# Patient Record
Sex: FEMALE | Race: WHITE
[De-identification: names, ages, dates, MRNs, and addresses within clinical notes are randomized per-mention and may not be internally consistent; named-entity substitution may affect disease eponyms.]

---

## 2018-04-04 ENCOUNTER — HOSPITAL ENCOUNTER (INPATIENT)
Dept: HOSPITAL 92 - ERS | Age: 83
LOS: 13 days | Discharge: HOSPICE | DRG: 871 | End: 2018-04-17
Attending: INTERNAL MEDICINE | Admitting: INTERNAL MEDICINE
Payer: MEDICARE

## 2018-04-04 VITALS — BODY MASS INDEX: 27.4 KG/M2

## 2018-04-04 DIAGNOSIS — E87.6: ICD-10-CM

## 2018-04-04 DIAGNOSIS — K81.0: ICD-10-CM

## 2018-04-04 DIAGNOSIS — N18.2: ICD-10-CM

## 2018-04-04 DIAGNOSIS — L89.892: ICD-10-CM

## 2018-04-04 DIAGNOSIS — F03.90: ICD-10-CM

## 2018-04-04 DIAGNOSIS — I12.0: ICD-10-CM

## 2018-04-04 DIAGNOSIS — E78.5: ICD-10-CM

## 2018-04-04 DIAGNOSIS — M81.0: ICD-10-CM

## 2018-04-04 DIAGNOSIS — G92: ICD-10-CM

## 2018-04-04 DIAGNOSIS — Z88.5: ICD-10-CM

## 2018-04-04 DIAGNOSIS — K83.0: ICD-10-CM

## 2018-04-04 DIAGNOSIS — R65.20: ICD-10-CM

## 2018-04-04 DIAGNOSIS — I49.5: ICD-10-CM

## 2018-04-04 DIAGNOSIS — E03.9: ICD-10-CM

## 2018-04-04 DIAGNOSIS — Z90.710: ICD-10-CM

## 2018-04-04 DIAGNOSIS — N39.0: ICD-10-CM

## 2018-04-04 DIAGNOSIS — I48.2: ICD-10-CM

## 2018-04-04 DIAGNOSIS — A41.9: Primary | ICD-10-CM

## 2018-04-04 DIAGNOSIS — L89.151: ICD-10-CM

## 2018-04-04 DIAGNOSIS — J69.0: ICD-10-CM

## 2018-04-04 DIAGNOSIS — E86.0: ICD-10-CM

## 2018-04-04 DIAGNOSIS — Z79.01: ICD-10-CM

## 2018-04-04 DIAGNOSIS — Z86.73: ICD-10-CM

## 2018-04-04 LAB
ALBUMIN SERPL BCG-MCNC: 3 G/DL (ref 3.4–4.8)
ALP SERPL-CCNC: 232 U/L (ref 40–150)
ALT SERPL W P-5'-P-CCNC: 201 U/L (ref 8–55)
ANION GAP SERPL CALC-SCNC: 10 MMOL/L (ref 10–20)
APTT PPP: 40.3 SEC (ref 22.9–36.1)
AST SERPL-CCNC: 239 U/L (ref 5–34)
BILIRUB SERPL-MCNC: 4.6 MG/DL (ref 0.2–1.2)
BUN SERPL-MCNC: 31 MG/DL (ref 9.8–20.1)
CALCIUM SERPL-MCNC: 8.6 MG/DL (ref 7.8–10.44)
CHLORIDE SERPL-SCNC: 107 MMOL/L (ref 98–107)
CO2 SERPL-SCNC: 28 MMOL/L (ref 23–31)
CREAT CL PREDICTED SERPL C-G-VRATE: 0 ML/MIN (ref 70–130)
GLOBULIN SER CALC-MCNC: 3.4 G/DL (ref 2.4–3.5)
GLUCOSE SERPL-MCNC: 120 MG/DL (ref 83–110)
HGB BLD-MCNC: 14.1 G/DL (ref 12–16)
INR PPP: 2.3
LIPASE SERPL-CCNC: (no result) U/L (ref 8–78)
MCH RBC QN AUTO: 32.4 PG (ref 27–31)
MCV RBC AUTO: 96.7 FL (ref 81–99)
MDIFF COMPLETE?: YES
PLATELET # BLD AUTO: 253 THOU/UL (ref 130–400)
PLATELET BLD QL SMEAR: (no result)
POTASSIUM SERPL-SCNC: 3.5 MMOL/L (ref 3.5–5.1)
PROTHROMBIN TIME: 26.2 SEC (ref 12–14.7)
RBC # BLD AUTO: 4.36 MILL/UL (ref 4.2–5.4)
SODIUM SERPL-SCNC: 141 MMOL/L (ref 136–145)
WBC # BLD AUTO: 29.6 THOU/UL (ref 4.8–10.8)

## 2018-04-04 PROCEDURE — 74177 CT ABD & PELVIS W/CONTRAST: CPT

## 2018-04-04 PROCEDURE — 36569 INSJ PICC 5 YR+ W/O IMAGING: CPT

## 2018-04-04 PROCEDURE — 85610 PROTHROMBIN TIME: CPT

## 2018-04-04 PROCEDURE — 83735 ASSAY OF MAGNESIUM: CPT

## 2018-04-04 PROCEDURE — 86140 C-REACTIVE PROTEIN: CPT

## 2018-04-04 PROCEDURE — C1729 CATH, DRAINAGE: HCPCS

## 2018-04-04 PROCEDURE — A4216 STERILE WATER/SALINE, 10 ML: HCPCS

## 2018-04-04 PROCEDURE — A4353 INTERMITTENT URINARY CATH: HCPCS

## 2018-04-04 PROCEDURE — 85049 AUTOMATED PLATELET COUNT: CPT

## 2018-04-04 PROCEDURE — 77012 CT SCAN FOR NEEDLE BIOPSY: CPT

## 2018-04-04 PROCEDURE — 85025 COMPLETE CBC W/AUTO DIFF WBC: CPT

## 2018-04-04 PROCEDURE — 87186 SC STD MICRODIL/AGAR DIL: CPT

## 2018-04-04 PROCEDURE — 36415 COLL VENOUS BLD VENIPUNCTURE: CPT

## 2018-04-04 PROCEDURE — 96361 HYDRATE IV INFUSION ADD-ON: CPT

## 2018-04-04 PROCEDURE — 85060 BLOOD SMEAR INTERPRETATION: CPT

## 2018-04-04 PROCEDURE — 81001 URINALYSIS AUTO W/SCOPE: CPT

## 2018-04-04 PROCEDURE — 87086 URINE CULTURE/COLONY COUNT: CPT

## 2018-04-04 PROCEDURE — 80069 RENAL FUNCTION PANEL: CPT

## 2018-04-04 PROCEDURE — 83690 ASSAY OF LIPASE: CPT

## 2018-04-04 PROCEDURE — 87205 SMEAR GRAM STAIN: CPT

## 2018-04-04 PROCEDURE — 80048 BASIC METABOLIC PNL TOTAL CA: CPT

## 2018-04-04 PROCEDURE — 80053 COMPREHEN METABOLIC PANEL: CPT

## 2018-04-04 PROCEDURE — 87070 CULTURE OTHR SPECIMN AEROBIC: CPT

## 2018-04-04 PROCEDURE — 85730 THROMBOPLASTIN TIME PARTIAL: CPT

## 2018-04-04 PROCEDURE — 89051 BODY FLUID CELL COUNT: CPT

## 2018-04-04 PROCEDURE — 82565 ASSAY OF CREATININE: CPT

## 2018-04-04 PROCEDURE — 36416 COLLJ CAPILLARY BLOOD SPEC: CPT

## 2018-04-04 PROCEDURE — 47010 HEPATOT OPN DRG ABSC/CST 1/2: CPT

## 2018-04-04 PROCEDURE — C9113 INJ PANTOPRAZOLE SODIUM, VIA: HCPCS

## 2018-04-04 PROCEDURE — 85014 HEMATOCRIT: CPT

## 2018-04-04 PROCEDURE — 85018 HEMOGLOBIN: CPT

## 2018-04-04 PROCEDURE — 71045 X-RAY EXAM CHEST 1 VIEW: CPT

## 2018-04-04 PROCEDURE — 96365 THER/PROPH/DIAG IV INF INIT: CPT

## 2018-04-04 PROCEDURE — 83605 ASSAY OF LACTIC ACID: CPT

## 2018-04-04 PROCEDURE — 87077 CULTURE AEROBIC IDENTIFY: CPT

## 2018-04-04 PROCEDURE — C1751 CATH, INF, PER/CENT/MIDLINE: HCPCS

## 2018-04-04 PROCEDURE — 84100 ASSAY OF PHOSPHORUS: CPT

## 2018-04-04 PROCEDURE — 84484 ASSAY OF TROPONIN QUANT: CPT

## 2018-04-04 PROCEDURE — 82805 BLOOD GASES W/O2 SATURATION: CPT

## 2018-04-04 PROCEDURE — 70450 CT HEAD/BRAIN W/O DYE: CPT

## 2018-04-05 RX ADMIN — Medication PRN ML: at 23:04

## 2018-04-05 RX ADMIN — Medication SCH ML: at 09:57

## 2018-04-05 RX ADMIN — POTASSIUM CHLORIDE, DEXTROSE MONOHYDRATE AND SODIUM CHLORIDE SCH MLS: 150; 5; 450 INJECTION, SOLUTION INTRAVENOUS at 23:54

## 2018-04-05 RX ADMIN — POTASSIUM CHLORIDE, DEXTROSE MONOHYDRATE AND SODIUM CHLORIDE SCH: 150; 5; 450 INJECTION, SOLUTION INTRAVENOUS at 18:23

## 2018-04-05 RX ADMIN — POTASSIUM CHLORIDE, DEXTROSE MONOHYDRATE AND SODIUM CHLORIDE SCH MLS: 150; 5; 450 INJECTION, SOLUTION INTRAVENOUS at 08:52

## 2018-04-05 RX ADMIN — Medication SCH ML: at 23:04

## 2018-04-05 NOTE — CON
DATE OF CONSULTATION:  04/05/2018

 

CHIEF COMPLAINT:  Possible cholecystitis.

 

HISTORY OF PRESENT ILLNESS:  The patient is an 88-year-old female who lives in a nursing home with stevie kay.  She is DNR.  Apparently yesterday, she stopped eating and developed nausea and vomiting.  Disha randle went to the emergency room in [place] where a CT scan and ultrasound showed a thickened gallbladder
 wall, multiple cholelithiasis.  She was sent here.

 

PAST MEDICAL HISTORY:  Significant for atrial fibrillation on Eliquis, history of CVA in 2014, hypert
ension, hypothyroidism, dementia, sick sinus syndrome.

 

PAST SURGICAL HISTORY:  She had a left femur fracture repair in 2010.  She has had a thyroidectomy an
d hysterectomy.

 

MEDICATIONS:  Aspirin, diltiazem, benzoate, Eliquis, Lasix, losartan, nadolol, potassium.

 

ALLERGIES:  She is allergic to CODEINE.

 

SOCIAL HISTORY:  Again, she lives in a nursing home.  No tobacco or alcohol.  I spoke to her daughter
 and daughter-in-law.

 

PHYSICAL EXAMINATION:

VITAL SIGNS:  Temperature 96.8, pulse 96, blood pressure 110/71.

GENERAL:    Elderly female, really not responsive.  She will open her eyes, but that is about it.  Th
ere is no interaction.

HEENT:  She is jaundiced.  She got obvious jaundice.

LUNGS:  Clear.

HEART:  Irregularly irregular.

ABDOMEN:  Soft.  She has some mild right upper quadrant tenderness to deep palpation, no palpable mas
s.

EXTREMITIES:  Unremarkable.

 

LABORATORY DATA AND X-RAY FINDINGS:  White count 29.6, H&H is 14 and 42, platelet count 253.  Electro
lytes show creatinine 0.8, glucose is elevated at 120.  Her bilirubin is 4.6, AST of 239, alkaline ph
osphatase 232, lipase is normal.

 

ASSESSMENT:  Acute cholecystitis, possible choledocholithiasis.

 

PLAN:  I talked to the daughter and daughter-in-law.  They are really not interested in the patient h
aving surgery, looking at other options, she is also high risk for surgery.  We will recommend GI con
sultation for possible ERCP as well as percutaneous drainage of the gallbladder.  We will talk to Rad
iology for that procedure.

## 2018-04-05 NOTE — CT
CT OF THE ABDOMEN AND PELVIS WITHOUT IV CONTRAST:

 

INDICATION: 

History of gallbladder disease requiring cholecystotomy tube.

 

COMPARISON: 

None.

 

FINDINGS: 

There are small bilateral pleural effusions and bibasilar atelectasis.  

 

There is cardiomegaly.

 

The adrenal glands are unremarkable.  There is some mild fatty atrophy of the pancreas.  The spleen i
s small.  There is fatty infiltration of the liver.  

 

There is a gallstone within a mildly distended gallbladder.  There is dilatation of the common bile d
uct without definite evidence of an intraluminal stone.  There is some mild nonspecific mesenteric st
randing within the mesenteric root.  There are small cysts involving both kidneys.

 

There is prominent vascular calcification involving the abdominopelvic vasculature.

 

Visualized aspects of the bladder are unremarkable.  There is wall thickening involving the rectum, w
hich is nonspecific.  There are a few scattered colonic diverticula without evidence of active divert
iculitis.

 

There is a right inguinal hernia containing nonobstructed loops of small bowel.  The appendix is not 
definitely seen.  There are multiple compression abnormalities involving the thoracolumbar spine.  Th
amadeo are of indeterminate chronicity.  There is diffuse osteopenia.  There is complete occlusion of th
e proximal right SFA artery.  There is a healed instrumented left hip fracture.  No acute osseous abn
ormality is evident.

 

IMPRESSION: 

1.  Cholelithiasis with mild distention of the gallbladder.  Recommend correlation with clinical exam
 for cholecystitis.

 

2.  Dilatation of the common bile duct without visible definite intraluminal stone. Minimal intrahepa
tic biliary ductal dilation.

 

3.  Small bilateral pleural effusion and cardiomegaly.  Recommend correlation regarding congestive he
art failure.

 

4.  Complete occlusion of the proximal right superficial femoral artery.

 

5.  Right inguinal hernia containing nonobstructive loops of small bowel.

 

6.  Small renal cysts.

 

7.  Colonic diverticulosis.

 

8.  Nonspecific wall thickening involving the rectum may reflect a component of proctitis.  Recommend
 correlation.

 

9.  Multiple age-indeterminate compression abnormalities involving T11, L1, L3, L4, and L5.

 

POS: [facility]

## 2018-04-05 NOTE — HP
DATE OF ADMISSION:  04/04/2018

 

PRIMARY CARE PHYSICIAN:  Dr. Sr.

 

CODE STATUS:  DO NOT RESUSCITATE.  Surrogate decision maker is the daughter at the bedside.

 

CHIEF COMPLAINT:  The patient is a transfer from [facility] for possible cholangitis.

 

HISTORY OF PRESENT ILLNESS:  The patient is an 88-year-old female with dementia, currently residing a
[facility], was brought in to [facility] with altered mentation.  Her workup in the
 emergency room was consistent with possible cholangitis.  Her lactic acid was 3.5.  Urinalysis showe
d nitrite positive, 20-50 wbc's, small amount of leukocyte esterase with many bacteria.  BNP was 1100
.  Troponin was negative.  BUN was 34, creatinine 0.96, potassium 4.2, bilirubin of 5.6 with alkaline
 phosphatase 254, , ALT of 265.  WBC 33.6, hemoglobin 14.6.  Lactic acid 3.8.  INR 2.2.  Ultra
sound of the abdomen showed cholelithiasis without sonographic findings to suggest acute cholecystiti
s.  CT abdomen and pelvis with contrast was consistent with cholelithiasis and dilated gallbladder wi
thout other findings of acute cholecystitis.  It showed indirect right inguinal hernia containing non
obstructed loops of distal small bowel.  There was some possible distal proctocolitis, which may be s
tercoral.  Chest x-ray was negative for infiltrate.  CT scan of the brain was negative for acute find
ings.  She received Zosyn with IV fluids and was transferred to this facility for hospital admission.


 

PAST MEDICAL HISTORY:

1.  Chronic atrial fibrillation on anticoagulation.

2.  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

3.  Hypertension.

4.  Hypothyroidism.

5.  Dementia.

6.  Sick sinus syndrome.

7.  Osteoporosis.

8.  Hard of hearing.

9.  Chronic anemia.

 

PAST SURGICAL HISTORY:

1.  Left femur surgery in 2010.

2.  Thyroid surgery.

3.  Hysterectomy.

 

ALLERGIES:  The patient is allergic to CODEINE.

 

CURRENT HOME MEDICATIONS:  We are trying to contact [facility].  There is no medication list in [facility] chart.

 

SOCIAL HISTORY:  As discussed above.  No smoking, alcohol or drug use.  She is wheelchair bound.  She
 requires help with transfers.  She is currently on pureed diet.  She has chronic cognitive issues an
d at times she is able to recognize her daughter.

 

FAMILY HISTORY:  Positive for hypertension and hypothyroidism.

 

REVIEW OF SYSTEMS:  Cannot be obtained reliably due to patient's current cognitive status.

 

PHYSICAL EXAMINATION:

VITAL SIGNS:  Current vital signs showed temperature 98.2, pulse rate of 119, respirations 20, blood 
pressure 115/81 with O2 saturation 99% on 2 liter nasal cannula.

GENERAL:  An 88-year-old female in no apparent distress.  Mentation at baseline per family at the bed
side.

HEENT:  Head atraumatic, normocephalic.  Sclerae are anicteric.  Dry mucous membranes.  No oral lesio
n.

NECK:  Supple, no JVD appreciated.  No carotid bruit.

LUNGS:  Showed decreased air entry at bilateral bases.  No significant wheezing, rales or rhonchi.

HEART:  S1, S2 present.  Irregularly irregular.  No murmur, rubs, or gallops appreciated.

ABDOMEN:  Soft, nontender, bowel sounds present, no rebound, guarding appreciated.

EXTREMITIES:  No edema or calf tenderness.

NEUROLOGIC:  Could not be done due to current cognitive status.

PSYCHIATRIC:  Could not be done due to current cognitive status.

SKIN:  Warm and dry.

LYMPH NODES:  No palpable lymph nodes in the neck.

 

LABORATORY AND X-RAY FINDINGS:  As discussed above.  Repeat WBC at this facility was 29.6 with 14% ba
ndemia.  INR was 2.3.  Total bilirubin 4.6 with , , alkaline phosphatase 232, albumin w
as 3.0.  Lipase was negative.  Magnesium 1.9.  A 2D echocardiogram from last admission showed left ve
ntricular ejection fraction of 50%-55%.  EKG by my review showed atrial fibrillation with nonspecific
 ST-T wave changes.

 

IMPRESSION:

1.  Sepsis with acute organ dysfunction with abnormal liver function tests.  Possibilities include ac
erinn cholangitis/questionable acute cholecystitis.

2.  Urinary tract infection.

3.  Dehydration.

4.  Chronic atrial fibrillation on anticoagulation.

5.  Hypertension.

6.  Hyperlipidemia.

7.  Dementia.

8.  Hypothyroidism.

9.  Swallow dysfunction, currently on pureed diet.

10.  CODEINE allergy.

11.  Chronic kidney disease stage 2.

 

PLAN:  The patient will be monitored in the telemetry unit.  We will keep her n.p.o.  Consult GI and 
General Surgery in a.m.  Continue Zosyn and urine cultures, IV fluids.  Monitor LFTs.  Repeat lactic 
acid was normal.  We will confirm home medications from [facility].

 

Plan of care was discussed with the patient's daughter at the bedside, she stated understanding.

 

Fall precautions.

 

N.p.o. for now.

## 2018-04-05 NOTE — PDOC.PN
- Subjective


Encounter Start Date: 04/05/18


Encounter Start Time: 14:14


Subjective: exhausted.no new complaints


-: daughter at bedside.care discussed





- Objective


Resuscitation Status: 


 











Resuscitation Status           DNR:Do Not Resuscitate














MAR Reviewed: Yes


Vital Signs & Weight: 


 Vital Signs (12 hours)











  Temp Pulse Resp BP Pulse Ox


 


 04/05/18 08:00  96.8 F L  96  20  110/71  96


 


 04/05/18 04:44  96.9 F L  90  36 H  100/61  93 L











I&O: 


 











 04/04/18 04/05/18 04/06/18





 06:59 06:59 06:59


 


Intake Total  600 0


 


Balance  600 0











Result Diagrams: 


 04/04/18 22:39





 04/04/18 22:39


Radiology Reviewed by me: Yes





Phys Exam





- Physical Examination


Constitutional: NAD


HEENT: PERRLA, moist MMs, sclera anicteric, oral pharynx no lesions


Neck: no nodes, no JVD, supple, full ROM


Respiratory: no wheezing, no rales, no rhonchi, clear to auscultation bilateral


Cardiovascular: RRR, no significant murmur


Gastrointestinal: soft, no distention


Musculoskeletal: no edema, pulses present


Neurological: moves all 4 limbs


Psychiatric: normal affect, A&O x 3


Skin: no rash





Dx/Plan


(1) Sepsis


Code(s): A41.9 - SEPSIS, UNSPECIFIED ORGANISM   Status: Acute   





(2) Acute cholangitis


Code(s): K83.0 - CHOLANGITIS   Status: Acute   





(3) UTI (urinary tract infection)


Status: Acute   





(4) Chronic atrial fibrillation


Code(s): I48.2 - CHRONIC ATRIAL FIBRILLATION   Status: Chronic   Comment: rate 

controlled. Eliquis on hold in anticipation of surgery   





(5) HTN (hypertension)


Code(s): I10 - ESSENTIAL (PRIMARY) HYPERTENSION   Status: Acute   





(6) Dementia


Code(s): F03.90 - UNSPECIFIED DEMENTIA WITHOUT BEHAVIORAL DISTURBANCE   Status: 

Acute   





- Plan


plan discussed w/ family, PT/OT, out of bed/ambulate, DVT proph w/SCDs


cont IVF,IV ABx,


-: awaiting GI and GS recs.


-: follow LFts.


-: resume home meds except for anticoagulation.


-: am labs





* .

## 2018-04-06 LAB
ALBUMIN SERPL BCG-MCNC: 2.8 G/DL (ref 3.4–4.8)
ALP SERPL-CCNC: 190 U/L (ref 40–150)
ALT SERPL W P-5'-P-CCNC: 102 U/L (ref 8–55)
ANION GAP SERPL CALC-SCNC: 8 MMOL/L (ref 10–20)
AST SERPL-CCNC: 56 U/L (ref 5–34)
BASOPHILS # BLD AUTO: 0 THOU/UL (ref 0–0.2)
BASOPHILS NFR BLD AUTO: 0.1 % (ref 0–1)
BILIRUB SERPL-MCNC: 2.1 MG/DL (ref 0.2–1.2)
BUN SERPL-MCNC: 16 MG/DL (ref 9.8–20.1)
CALCIUM SERPL-MCNC: 8.3 MG/DL (ref 7.8–10.44)
CHLORIDE SERPL-SCNC: 110 MMOL/L (ref 98–107)
CO2 SERPL-SCNC: 28 MMOL/L (ref 23–31)
CREAT CL PREDICTED SERPL C-G-VRATE: 58 ML/MIN (ref 70–130)
EOSINOPHIL # BLD AUTO: 0.1 THOU/UL (ref 0–0.7)
EOSINOPHIL NFR BLD AUTO: 0.4 % (ref 0–10)
GLOBULIN SER CALC-MCNC: 3.2 G/DL (ref 2.4–3.5)
GLUCOSE SERPL-MCNC: 141 MG/DL (ref 83–110)
HGB BLD-MCNC: 13.5 G/DL (ref 12–16)
LYMPHOCYTES # BLD: 1.8 THOU/UL (ref 1.2–3.4)
LYMPHOCYTES NFR BLD AUTO: 13.5 % (ref 21–51)
MAGNESIUM SERPL-MCNC: 1.9 MG/DL (ref 1.6–2.6)
MCH RBC QN AUTO: 31.1 PG (ref 27–31)
MCV RBC AUTO: 98.3 FL (ref 81–99)
MONOCYTES # BLD AUTO: 0.6 THOU/UL (ref 0.11–0.59)
MONOCYTES NFR BLD AUTO: 4.4 % (ref 0–10)
NEUTROPHILS # BLD AUTO: 10.6 THOU/UL (ref 1.4–6.5)
NEUTROPHILS NFR BLD AUTO: 81.5 % (ref 42–75)
PLATELET # BLD AUTO: 252 THOU/UL (ref 130–400)
POTASSIUM SERPL-SCNC: 3.3 MMOL/L (ref 3.5–5.1)
RBC # BLD AUTO: 4.34 MILL/UL (ref 4.2–5.4)
RBC # FLD AUTO: 45 /CUMM
RBC BACKGROUND COUNT: 0.01
SODIUM SERPL-SCNC: 143 MMOL/L (ref 136–145)
WBC # BLD AUTO: 13 THOU/UL (ref 4.8–10.8)
WBC # FLD AUTO: 251 /CUMM
WBC BACKGROUND COUNT: 0

## 2018-04-06 PROCEDURE — 0F943ZZ DRAINAGE OF GALLBLADDER, PERCUTANEOUS APPROACH: ICD-10-PCS | Performed by: RADIOLOGY

## 2018-04-06 RX ADMIN — POTASSIUM CHLORIDE, DEXTROSE MONOHYDRATE AND SODIUM CHLORIDE SCH MLS: 150; 5; 450 INJECTION, SOLUTION INTRAVENOUS at 13:34

## 2018-04-06 RX ADMIN — Medication SCH ML: at 09:56

## 2018-04-06 RX ADMIN — Medication SCH: at 20:54

## 2018-04-06 NOTE — CT
CT GUIDED SUBCUTANEOUS CHOLECYSTOTOMY:

 

HISTORY: 

Acute cholecystitis.  Poor surgical candidate. 

 

FINDINGS: 

After explaining the procedure and answering all questions, limited CT imaging of the upper abdomen w
as performed.  Anterior right upper quadrant approach as planned.  Sterile technique, buffered local 
anesthesia, CT guidance, and an anterolateral approach were used to carefully advance the tip of 22-g
auge needle into the gallbladder lumen.  While attempt was made to achieve purchase through the hepat
ic parenchyma, the gallbladder anatomy, position, and patient inability to cooperate with respiration
 of breathing precluded good hepatic parenchyma purchase approach.

 

Acupuncture system technique was then used to place a 0.035 Amplatz wire.  The tract was dilated to 8
 [place] and an 8 [place] locking loop Uresil catheter was placed in the gallbladder lumen.  Approximat
ely 20 cc of thick greenish liquid was aspirated.  A portion was sent to pathology for evaluation.  T
he catheter was secured externally with 2-0 Ethilon suture and left draining to gravity.  The patient
 tolerate the procedure well and was returned in improved condition.

 

IMPRESSION: 

Technically successful CT-guided cholecystostomy.  Pathology is pending.

 

POS: [facility]

## 2018-04-06 NOTE — PDOC.PN
- Subjective


Encounter Start Date: 04/06/18


Encounter Start Time: 14:12


Subjective: S/P CT GUIDED CHOLECYSTOSTOMY


-: Not bal eto discuss care d/t advanced dementia


-: family at bedside.care discussed w them.they report that pt is at baseline





- Objective


Resuscitation Status: 


 











Resuscitation Status           DNR:Do Not Resuscitate














MAR Reviewed: Yes


Vital Signs & Weight: 


 Vital Signs (12 hours)











  Temp Pulse Pulse Resp BP BP Pulse Ox


 


 04/06/18 12:15  97.4 F L  112 H   24 H   170/72 H  100


 


 04/06/18 08:40  97.3 F L  101 H   32 H   132/84  100


 


 04/06/18 08:00  98.2 F  74   28 H   144/74 H  95


 


 04/06/18 07:46    105 H   192/83 H  


 


 04/06/18 04:00  98.2 F  105 H   20   151/72 H  97














  Pulse Ox


 


 04/06/18 12:15 


 


 04/06/18 08:40 


 


 04/06/18 08:00 


 


 04/06/18 07:46  91 L


 


 04/06/18 04:00 








 Weight











Admit Weight                   145 lb 4 oz


 


Weight                         145 lb 4 oz














I&O: 


 











 04/05/18 04/06/18 04/07/18





 06:59 06:59 06:59


 


Intake Total 600 1000 


 


Balance 600 1000 











Result Diagrams: 


 04/06/18 05:01





 04/06/18 05:01


Additional Labs: 





 Laboratory Tests











  04/04/18 04/06/18





  22:39 05:01


 


Total Bilirubin  4.6 H  2.1 H


 


AST  239 H  56 H


 


ALT  201 H  102 H


 


Alkaline Phosphatase  232 H  190 H











Radiology Reviewed by me: Yes (Ct A/P- SFA occlusion R side,cholelithiasis)





Phys Exam





- Physical Examination


Constitutional: NAD


awake,doesn't follow commands.very hard of hearing.responds better to famil


dry mucosa,mouth breathing


Neck: no nodes, no JVD, supple, full ROM


Respiratory: no wheezing, no rales, no rhonchi, clear to auscultation bilateral


Cardiovascular: RRR, no significant murmur


Gastrointestinal: soft, no distention, positive bowel sounds


GB drain in place w brownish liquid


Musculoskeletal: no edema, pulses present


Neurological: moves all 4 limbs


Psychiatric: normal affect





Dx/Plan


(1) Sepsis


Code(s): A41.9 - SEPSIS, UNSPECIFIED ORGANISM   Status: Acute   





(2) Acute cholangitis


Code(s): K83.0 - CHOLANGITIS   Status: Acute   





(3) UTI (urinary tract infection)


Status: Acute   Comment: Cx pending.   





(4) Chronic atrial fibrillation


Code(s): I48.2 - CHRONIC ATRIAL FIBRILLATION   Status: Chronic   Comment: rate 

controlled. Eliquis on hold in anticipation of surgery   





(5) HTN (hypertension)


Code(s): I10 - ESSENTIAL (PRIMARY) HYPERTENSION   Status: Acute   





(6) Dementia


Code(s): F03.90 - UNSPECIFIED DEMENTIA WITHOUT BEHAVIORAL DISTURBANCE   Status: 

Acute   





- Plan


valle catheter, continue antibiotics, PT/OT, speech therapy, DVT proph w/SCDs


Cont post-op care.appreciate GI & GS input


-: cont ABx.


-: SFA oclusion discussed w family.they do not want any surgery for her.


-: replace and recheck potassium


-: Home meds not updated.Eliquis on hold





* .OT,PT. may need rehab.Lives at [facility]








Review of Systems





- Review of Systems


Other: 





can not be obtained due to dementia





- Medications/Allergies


Allergies/Adverse Reactions: 


 Allergies











Allergy/AdvReac Type Severity Reaction Status Date / Time


 


codeine Allergy   Verified 07/21/14 20:16











Medications: 


 Current Medications





Calcium Carbonate (Tums)  1,000 mg PO Q4H PRN


   PRN Reason: Heartburn  or Indigestion


Hydralazine HCl (Apresoline)  10 mg SLOW IVP Q4H PRN


   PRN Reason: SBP Greater Than 180


Piperacillin Sod/Tazobactam (Sod 3.375 gm/ Sodium Chloride)  100 mls @ 200 mls/

hr IVPB Q6HR [facility]


   Last Admin: 04/06/18 12:16 Dose:  100 mls


Potassium Chloride/Dextrose/Sod Cl (D5 1/2 Ns W/20 Meq Kcl)  1,000 mls @ 75 mls/

hr IV .[id number] [facility]


   Last Admin: 04/06/18 13:34 Dose:  1,000 mls


Miscellaneous Medication (Pharmacy To Dose)  1 each IVPB PRN PRN


   PRN Reason: Pharmacy to dose


Morphine Sulfate (Morphine)  2 mg SLOW IVP NOW [facility]


   Stop: 04/06/18 15:00


   Last Admin: 04/06/18 13:33 Dose:  2 mg


Ondansetron HCl (Zofran Odt)  4 mg PO Q6H PRN


   PRN Reason: Nausea/Vomiting


Ondansetron HCl (Zofran)  4 mg IVP Q6H PRN


   PRN Reason: Nausea/Vomiting


Pantoprazole Sodium (Protonix)  40 mg IVP DAILY [facility]


   Last Admin: 04/06/18 09:55 Dose:  40 mg


Sodium Chloride (Flush - Normal Saline)  10 ml IVF Q12HR [facility]


   Last Admin: 04/06/18 09:56 Dose:  10 ml


Sodium Chloride (Flush - Normal Saline)  10 ml IVF PRN PRN


   PRN Reason: Saline Flush


   Last Admin: 04/05/18 23:04 Dose:  10 ml

## 2018-04-06 NOTE — PRG
DATE OF SERVICE:  04/06/2018

 

SUBJECTIVE:  Ms. Karolina Celestin is an 88-year-old unfortunate female hospitalized with abnormal LF
Ts and sepsis and possible cholangitis.  The patient was seen by Dr. Zander Carey and patient is high r
isk for surgery.  She underwent a percutaneous cholecystostomy by Dr. Kumar this morning.  She is d
oing well at the present time.  She is aphasic and she cannot communicate.

 

OBJECTIVE:

GENERAL:  Appears comfortable.

VITAL SIGNS:  Temperature 98.3 Fahrenheit.  Pulse is 74, blood pressure 144/77.

CARDIOVASCULAR:  Within normal limits.

LUNGS:  Within normal limits.

ABDOMEN:  Soft to palpate.  Abdomen is nondistended.

 

The patient's LFTs are coming down even before her cholecystostomy.

 

LABORATORY DATA:  The lab data from today, sodium 143, potassium 3.3, chloride 110, bicarbonate 28, B
UN is 16, creatinine 0.70.  Glucose 141, bilirubin down to 2.1, AST down to 56, , alkaline jenny
sphatase 190.  CBC: WBC count has come down to 13,000, normal hemoglobin and hematocrit.  The differe
ntial count shows no bandemia today, polymorphs 81, monocytes 4, basophils 0.1, lymphocytes 13.5.

 

RECOMMENDATION:  Possible cholangitis, cholecystitis, status post cholecystectomy.

 

From GI standpoint, _____ workup planned.  Hopefully she can be discharged back to the nursing home i
n the near future.

## 2018-04-06 NOTE — CON
DATE OF CONSULTATION:  04/05/2018

 

REFERRING PHYSICIAN:  Dr. Moe Meraz.

 

REASON FOR CONSULTATION:  Abnormal liver function tests and distended gallbladder on sonogram and CAT
 scan and possibility of cholangitis.

 

HISTORY OF PRESENT ILLNESS:  Ms. Karolina Celestin is an 88-year-old  female, who is a nursi
ng home resident.  Her regular primary care doctor, Dr. Sr.  The patient was seen in [facility] in [place] with altered mental status.  The patient was seen at [facility] and had ev
aluation done.  It was found to have a UTI and also evidence of abnormal LFTs.  The bilirubin level o
f 5.6 and alkaline phosphatase 254.  She had an abdominal sonogram and CAT scan.  The CAT scan and so
nogram showed markedly distended gallbladder, but did not show any evidence of any dilation of the bi
le duct.  She also had a leukocytosis with WBC count of more than 30,000 and also she has bandemia.  
Her lactic acid was 3.8.  It was felt that she has septic and there is a possibility of cholangitis. 
 She was transferred to [facility] last night.  The patient was last seen in the room along 
with the patient's daughters.  The patient is nonverbal.  Apparently, she has had a CVA in the past a
nd she is aphasic.  She also has dementia.  She is also hard of hearing.  Most of the history is obta
ined by going over the admitting history and physical by Dr. Meraz.  The patient was seen by Dr. Zander Carey today and Dr. Zander Carey recommended that percutaneous cholecystostomy because of poor surgica
l outcome _____.  The sonogram and CAT both showed gallbladder stone and also distended gallbladder. 
 There is no _____ dilated CBD.  The patient is lying in bed and appears comfortable in no distress. 
 There is no other relevant history available.

 

MEDICAL ILLNESSES:

1.  Chronic atrial fibrillation, on anticoagulation.

2.  History of cerebrovascular accident 2014.

3.  Dementia.

4.  Hypertension.

5.  Hypothyroidism.

6.  Sick sinus syndrome.

7.  Osteoporosis

8.  Chronic anemia.

 

SURGERIES:

1.  Status post left knee surgery in 2010.

2.  Thyroid surgery.

3.  Hysterectomy.

 

MEDICATIONS:  List reviewed.

 

REVIEW OF SYSTEMS:  Unobtainable because of the dementia and also aphasia.

 

PHYSICAL EXAMINATION:

GENERAL:  The patient appears comfortable and she really has no response to any questioning.  She is 
in no distress.  She is icteric.

VITAL SIGNS:  Temperature 99 degrees Fahrenheit.  Pulse is 80, blood pressure 130/70.

NECK:  Supple.

CARDIOVASCULAR:  First and second heart sounds normal.

LUNGS:  Clear to auscultation.

ABDOMEN:  Soft to palpate.  Abdomen is nondistended.  Abdomen is nontender even on deep palpation.  T
here is no rebound or guarding.  No organomegaly or masses.

 

LABORATORY DATA:  Showed a UTI with wbc's 20 to 50 and small amount of leukoesterase and many bacteri
a.  BUN was 34, creatinine 0.96, potassium 4.2, bilirubin 5.6, alkaline phosphatase 254.  , AL
T 265.  WBC 33,600 with bandemia, hemoglobin 14.6.  Lactic acid 3.8.

 

CLINICAL IMPRESSION:

1.  An 88-year-old female with altered mental status with no history of abdominal pain, nausea, vomit
ing.  The evaluation in the ER did show abnormal LFTs and subsequently, she had an abdominal sonogram
 and CAT scan.  Both showed gallstones and also distended gallbladder.  There is no dilated CBD or in
trahepatic ducts.

2.  Urinary tract infection.

 

I believe she most likely has acute cholecystitis and at the present time, there is no evidence of an
y bile duct dilatation.  I agree with Dr. Zander Carey's recommendation to proceed with a percutaneous 
cholecystostomy.

## 2018-04-07 LAB
ALBUMIN SERPL BCG-MCNC: 2.8 G/DL (ref 3.4–4.8)
ALP SERPL-CCNC: 174 U/L (ref 40–150)
ALT SERPL W P-5'-P-CCNC: 67 U/L (ref 8–55)
ANION GAP SERPL CALC-SCNC: 10 MMOL/L (ref 10–20)
AST SERPL-CCNC: 28 U/L (ref 5–34)
BASOPHILS # BLD AUTO: 0.1 THOU/UL (ref 0–0.2)
BASOPHILS NFR BLD AUTO: 0.5 % (ref 0–1)
BILIRUB SERPL-MCNC: 2.4 MG/DL (ref 0.2–1.2)
BUN SERPL-MCNC: 14 MG/DL (ref 9.8–20.1)
CALCIUM SERPL-MCNC: 8.4 MG/DL (ref 7.8–10.44)
CHLORIDE SERPL-SCNC: 113 MMOL/L (ref 98–107)
CO2 SERPL-SCNC: 23 MMOL/L (ref 23–31)
CREAT CL PREDICTED SERPL C-G-VRATE: 64 ML/MIN (ref 70–130)
EOSINOPHIL # BLD AUTO: 0.1 THOU/UL (ref 0–0.7)
EOSINOPHIL NFR BLD AUTO: 0.7 % (ref 0–10)
GLOBULIN SER CALC-MCNC: 3.1 G/DL (ref 2.4–3.5)
GLUCOSE SERPL-MCNC: 133 MG/DL (ref 83–110)
HGB BLD-MCNC: 13.8 G/DL (ref 12–16)
LYMPHOCYTES # BLD: 2.5 THOU/UL (ref 1.2–3.4)
LYMPHOCYTES NFR BLD AUTO: 21.9 % (ref 21–51)
MAGNESIUM SERPL-MCNC: 1.9 MG/DL (ref 1.6–2.6)
MCH RBC QN AUTO: 31.5 PG (ref 27–31)
MCV RBC AUTO: 97.6 FL (ref 81–99)
MONOCYTES # BLD AUTO: 0.8 THOU/UL (ref 0.11–0.59)
MONOCYTES NFR BLD AUTO: 6.7 % (ref 0–10)
NEUTROPHILS # BLD AUTO: 8.1 THOU/UL (ref 1.4–6.5)
NEUTROPHILS NFR BLD AUTO: 70.3 % (ref 42–75)
PLATELET # BLD AUTO: 233 THOU/UL (ref 130–400)
POTASSIUM SERPL-SCNC: 4.2 MMOL/L (ref 3.5–5.1)
RBC # BLD AUTO: 4.38 MILL/UL (ref 4.2–5.4)
SODIUM SERPL-SCNC: 142 MMOL/L (ref 136–145)
WBC # BLD AUTO: 11.5 THOU/UL (ref 4.8–10.8)

## 2018-04-07 RX ADMIN — Medication SCH ML: at 21:46

## 2018-04-07 RX ADMIN — LACTOBACILLUS ACIDOPH-L.BULGARICUS 1 MILLION CELL CHEWABLE TABLET SCH TAB: at 21:38

## 2018-04-07 RX ADMIN — POTASSIUM CHLORIDE, DEXTROSE MONOHYDRATE AND SODIUM CHLORIDE SCH MLS: 150; 5; 450 INJECTION, SOLUTION INTRAVENOUS at 05:38

## 2018-04-07 RX ADMIN — LACTOBACILLUS ACIDOPH-L.BULGARICUS 1 MILLION CELL CHEWABLE TABLET SCH TAB: at 15:27

## 2018-04-07 RX ADMIN — Medication SCH ML: at 08:55

## 2018-04-07 NOTE — PDOC.PN
- Subjective


Encounter Start Date: 04/07/18


Encounter Start Time: 14:02


Subjective: feels about the same. denies any pain.no new complaints


-: daughter at bedside. car ediscussed in detail


-: Pt w aphasia & dementia,so limited participation





- Objective


Resuscitation Status: 


 











Resuscitation Status           DNR:Do Not Resuscitate














MAR Reviewed: Yes


Vital Signs & Weight: 


 Vital Signs (12 hours)











  Temp Pulse Resp BP Pulse Ox


 


 04/07/18 11:25  96.3 F L  134 H  30 H  133/90  97


 


 04/07/18 07:41  96.8 F L  108 H  20  


 


 04/07/18 07:30  96.8 F L  108 H  20  141/93 H  98


 


 04/07/18 04:00  96.8 F L  106 H  18  135/87  97








 Weight











Admit Weight                   145 lb 4 oz


 


Weight                         145 lb 4 oz














I&O: 


 











 04/06/18 04/07/18 04/08/18





 06:59 06:59 06:59


 


Intake Total 1000 3930 


 


Output Total  550 


 


Balance 1000 3380 











Result Diagrams: 


 04/07/18 04:50





 04/07/18 04:50


Additional Labs: 





Microbiology





04/06/18 09:15   Gallbladder - Aspirate   Bacterial Culture - Preliminary





 Laboratory Tests











  04/04/18 04/06/18 04/06/18





  22:39 05:01 09:15


 


Total Bilirubin  4.6 H  2.1 H 


 


AST  239 H  56 H 


 


ALT  201 H  102 H 


 


Alkaline Phosphatase  232 H  190 H 


 


Fluid WBC    251


 


Fluid RBC (Manual)    45














  04/07/18





  04:50


 


Total Bilirubin  2.4 H


 


AST  28


 


ALT  67 H


 


Alkaline Phosphatase  174 H


 


Fluid WBC 


 


Fluid RBC (Manual) 














Phys Exam





- Physical Examination


Constitutional: NAD


sleeping but wakes up easily.jaundiced


HEENT: PERRLA, 2+ tonsils


Neck: no JVD


Respiratory: no wheezing, no rales, no rhonchi, clear to auscultation bilateral


Cardiovascular: no significant murmur, irregular


Gastrointestinal: soft, non-tender, no distention, positive bowel sounds


Musculoskeletal: no edema, pulses present


hemiparesis,aphasic


Psychiatric: normal affect


Skin: no rash





Dx/Plan


(1) Sepsis


Code(s): A41.9 - SEPSIS, UNSPECIFIED ORGANISM   Status: Acute   





(2) Acute cholangitis


Code(s): K83.0 - CHOLANGITIS   Status: Acute   





(3) UTI (urinary tract infection)


Status: Acute   Comment: Cx pending.GNR   





(4) Chronic atrial fibrillation


Code(s): I48.2 - CHRONIC ATRIAL FIBRILLATION   Status: Chronic   Comment: rate 

controlled. Eliquis on hold in anticipation of surgery   





(5) HTN (hypertension)


Code(s): I10 - ESSENTIAL (PRIMARY) HYPERTENSION   Status: Acute   





(6) Dementia


Code(s): F03.90 - UNSPECIFIED DEMENTIA WITHOUT BEHAVIORAL DISTURBANCE   Status: 

Acute   





- Plan


PT/OT, , respiratory therapy, incentive spirometry, DVT proph w/

SCDs


Home meds update and restarted including BB and CCB.


-: cont to hold eliquis to prevent risk of bleed from surgical site


-: cont ABx.follow Cx from urine & bile.on zosyn


-: DC IVF.hold lasix for now.If renal Fx stable,will restart from tomorrow


-: am labs.DC back to [facility] when ok w GS





* .








Review of Systems





- Review of Systems


Other: 





limited ROS due to demetia and Aphasia due to old stroke





- Medications/Allergies


Allergies/Adverse Reactions: 


 Allergies











Allergy/AdvReac Type Severity Reaction Status Date / Time


 


codeine Allergy   Verified 07/21/14 20:16











Medications: 


 Current Medications





Acidophilus (Floranex)  1 tab PO TID [facility]


Aspirin (Aspirin Chewable)  81 mg PO DAILY [facility]


Benzonatate (Tessalon)  200 mg PO TIDPRN PRN


   PRN Reason: Cough


Calcium Carbonate (Tums)  1,000 mg PO Q4H PRN


   PRN Reason: Heartburn  or Indigestion


Diltiazem HCl (Cardizem)  30 mg PO Q6H [facility]


   Last Admin: 04/07/18 12:55 Dose:  30 mg


Hydralazine HCl (Apresoline)  10 mg SLOW IVP Q4H PRN


   PRN Reason: SBP Greater Than 180


Piperacillin Sod/Tazobactam (Sod 3.375 gm/ Sodium Chloride)  100 mls @ 200 mls/

hr IVPB Q6HR [facility]


   Last Admin: 04/07/18 11:40 Dose:  100 mls


Levothyroxine Sodium (Synthroid)  75 mcg PO 0600 [facility]


Miscellaneous Medication (Pharmacy To Dose)  1 each IVPB PRN PRN


   PRN Reason: Pharmacy to dose


Nadolol (Corgard)  40 mg PO DAILY [facility]


Ondansetron HCl (Zofran Odt)  4 mg PO Q6H PRN


   PRN Reason: Nausea/Vomiting


Ondansetron HCl (Zofran)  4 mg IVP Q6H PRN


   PRN Reason: Nausea/Vomiting


Pantoprazole Sodium (Protonix)  40 mg PO DAILY [facility]


Simvastatin (Zocor)  5 mg PO HS [facility]


Sodium Chloride (Flush - Normal Saline)  10 ml IVF Q12HR [facility]


   Last Admin: 04/07/18 08:55 Dose:  10 ml


Sodium Chloride (Flush - Normal Saline)  10 ml IVF PRN PRN


   PRN Reason: Saline Flush


   Last Admin: 04/05/18 23:04 Dose:  10 ml


Tramadol HCl (Ultram)  50 mg PO Q4H PRN


   PRN Reason: Pain


   Last Admin: 04/06/18 20:58 Dose:  50 mg

## 2018-04-08 LAB
ALBUMIN SERPL BCG-MCNC: 2.8 G/DL (ref 3.4–4.8)
ALP SERPL-CCNC: 150 U/L (ref 40–150)
ALT SERPL W P-5'-P-CCNC: 50 U/L (ref 8–55)
ANION GAP SERPL CALC-SCNC: 12 MMOL/L (ref 10–20)
AST SERPL-CCNC: 22 U/L (ref 5–34)
BASOPHILS # BLD AUTO: 0 THOU/UL (ref 0–0.2)
BASOPHILS NFR BLD AUTO: 0.1 % (ref 0–1)
BILIRUB SERPL-MCNC: 2 MG/DL (ref 0.2–1.2)
BUN SERPL-MCNC: 16 MG/DL (ref 9.8–20.1)
CALCIUM SERPL-MCNC: 8.3 MG/DL (ref 7.8–10.44)
CHLORIDE SERPL-SCNC: 111 MMOL/L (ref 98–107)
CO2 SERPL-SCNC: 22 MMOL/L (ref 23–31)
CREAT CL PREDICTED SERPL C-G-VRATE: 62 ML/MIN (ref 70–130)
EOSINOPHIL # BLD AUTO: 0.1 THOU/UL (ref 0–0.7)
EOSINOPHIL NFR BLD AUTO: 0.6 % (ref 0–10)
GLOBULIN SER CALC-MCNC: 3.3 G/DL (ref 2.4–3.5)
GLUCOSE SERPL-MCNC: 130 MG/DL (ref 83–110)
HGB BLD-MCNC: 13.8 G/DL (ref 12–16)
LYMPHOCYTES # BLD: 2.1 THOU/UL (ref 1.2–3.4)
LYMPHOCYTES NFR BLD AUTO: 19.5 % (ref 21–51)
MCH RBC QN AUTO: 31.5 PG (ref 27–31)
MCV RBC AUTO: 96.9 FL (ref 81–99)
MONOCYTES # BLD AUTO: 0.9 THOU/UL (ref 0.11–0.59)
MONOCYTES NFR BLD AUTO: 8.4 % (ref 0–10)
NEUTROPHILS # BLD AUTO: 7.9 THOU/UL (ref 1.4–6.5)
NEUTROPHILS NFR BLD AUTO: 71.5 % (ref 42–75)
PLATELET # BLD AUTO: 234 THOU/UL (ref 130–400)
POTASSIUM SERPL-SCNC: 3.7 MMOL/L (ref 3.5–5.1)
RBC # BLD AUTO: 4.39 MILL/UL (ref 4.2–5.4)
SODIUM SERPL-SCNC: 141 MMOL/L (ref 136–145)
WBC # BLD AUTO: 11 THOU/UL (ref 4.8–10.8)

## 2018-04-08 RX ADMIN — Medication PRN ML: at 23:53

## 2018-04-08 RX ADMIN — LACTOBACILLUS ACIDOPH-L.BULGARICUS 1 MILLION CELL CHEWABLE TABLET SCH TAB: at 08:49

## 2018-04-08 RX ADMIN — Medication SCH ML: at 21:04

## 2018-04-08 RX ADMIN — LACTOBACILLUS ACIDOPH-L.BULGARICUS 1 MILLION CELL CHEWABLE TABLET SCH TAB: at 21:04

## 2018-04-08 RX ADMIN — Medication SCH: at 08:49

## 2018-04-08 RX ADMIN — LACTOBACILLUS ACIDOPH-L.BULGARICUS 1 MILLION CELL CHEWABLE TABLET SCH TAB: at 14:32

## 2018-04-08 NOTE — PDOC.PN
- Subjective


Encounter Start Date: 04/08/18


Encounter Start Time: 11:46


Subjective: feels OK. very hard of hearing so limited conversation


-: also severe dysarthria d/t old CVA


-: no new events per nursing 





- Objective


Resuscitation Status: 


 











Resuscitation Status           DNR:Do Not Resuscitate














MAR Reviewed: Yes


Vital Signs & Weight: 


 Vital Signs (12 hours)











  Temp Pulse Resp BP Pulse Ox


 


 04/08/18 08:00  97.4 F L  106 H  22 H  


 


 04/08/18 07:39  97.4 F L  106 H  22 H  160/97 H  94 L


 


 04/08/18 04:00  96.8 F L  98  32 H  139/76  97


 


 04/08/18 00:00  97.9 F  118 H  36 H  138/84  100








 Weight











Admit Weight                   145 lb 4 oz


 


Weight                         145 lb 4 oz














I&O: 


 











 04/07/18 04/08/18 04/09/18





 06:59 06:59 06:59


 


Intake Total 3930 1140 


 


Output Total 550 400 


 


Balance 3380 740 











Result Diagrams: 


 04/08/18 04:39





 04/08/18 04:39


Additional Labs: 





Microbiology





04/06/18 09:15   Gallbladder - Aspirate   Bacterial Culture - Final


                              Escherichia coli





 Laboratory Tests











  04/04/18 04/06/18 04/07/18





  22:39 05:01 04:50


 


Total Bilirubin  4.6 H  2.1 H  2.4 H


 


AST  239 H  56 H  28


 


ALT  201 H  102 H  67 H


 


Alkaline Phosphatase  232 H  190 H  174 H














  04/08/18





  04:39


 


Total Bilirubin  2.0 H


 


AST  22


 


ALT  50


 


Alkaline Phosphatase  150














Phys Exam





- Physical Examination


Constitutional: NAD


more awake and interactive today,mouth breather


HEENT: PERRLA, sclera anicteric


dry mucosa due to mouth breathing


Neck: no JVD


Respiratory: no wheezing, no rales, no rhonchi


Cardiovascular: no significant murmur, irregular


Gastrointestinal: soft, non-tender, no distention, positive bowel sounds


Musculoskeletal: no edema, pulses present


Neurological: non-focal, normal sensation, moves all 4 limbs


Psychiatric: normal affect


Skin: no rash


Deviation from normal: Jaundice improved





Dx/Plan


(1) Sepsis


Code(s): A41.9 - SEPSIS, UNSPECIFIED ORGANISM   Status: Acute   





(2) Acute cholangitis


Code(s): K83.0 - CHOLANGITIS   Status: Acute   Comment: S/P Cholecystostomy 

with external drain   





(3) UTI (urinary tract infection)


Status: Acute   Comment: Cx pending.GNR   





(4) Chronic atrial fibrillation


Code(s): I48.2 - CHRONIC ATRIAL FIBRILLATION   Status: Chronic   Comment: rate 

controlled. Eliquis on hold due to recent surgery   





(5) HTN (hypertension)


Code(s): I10 - ESSENTIAL (PRIMARY) HYPERTENSION   Status: Chronic   





(6) Dementia


Code(s): F03.90 - UNSPECIFIED DEMENTIA WITHOUT BEHAVIORAL DISTURBANCE   Status: 

Chronic   





(7) Transaminitis


Code(s): R74.0 - NONSPEC ELEV OF LEVELS OF TRANSAMNS & LACTIC ACID DEHYDRGNSE   

Status: Acute   Comment: improving.d/t GB stones and obstructive Jaundice   





(8) Hyperbilirubinemia


Code(s): E80.6 - OTHER DISORDERS OF BILIRUBIN METABOLISM   Status: Acute   

Comment: improving.d/t GB stones and obstructive Jaundice   





- Plan


valle catheter, continue antibiotics, PT/OT, respiratory therapy, incentive 

spirometry, out of bed/ambulate, DVT proph w/SCDs


Tachycardia improved w starting home meds including BB & CCB.BP WNL.monitor


-: E.coli in bile-sensitive to zosyn-continue.Drain in place


-: GS following.will need to know if pt needs to be discharged with/without it


-: LFTs,Bilirubin improving daily post Cholecystostomy


-: cont supportive care.restart lasix at lower dose & monitor renal Fx





* .








Review of Systems





- Review of Systems


Other: 





limited d/t dysarthria,hearing loss and dementia





- Medications/Allergies


Allergies/Adverse Reactions: 


 Allergies











Allergy/AdvReac Type Severity Reaction Status Date / Time


 


codeine Allergy   Verified 07/21/14 20:16











Medications: 


 Current Medications





Acidophilus (Floranex)  1 tab PO TID [facility]


   Last Admin: 04/08/18 08:49 Dose:  1 tab


Aspirin (Aspirin Chewable)  81 mg PO DAILY [facility]


   Last Admin: 04/08/18 08:49 Dose:  81 mg


Benzonatate (Tessalon)  200 mg PO TIDPRN PRN


   PRN Reason: Cough


Calcium Carbonate (Tums)  1,000 mg PO Q4H PRN


   PRN Reason: Heartburn  or Indigestion


Diltiazem HCl (Cardizem)  30 mg PO Q6H [facility]


   Last Admin: 04/08/18 05:05 Dose:  30 mg


Hydralazine HCl (Apresoline)  10 mg SLOW IVP Q4H PRN


   PRN Reason: SBP Greater Than 180


Piperacillin Sod/Tazobactam (Sod 3.375 gm/ Sodium Chloride)  100 mls @ 200 mls/

hr IVPB Q6HR [facility]


   Last Admin: 04/08/18 05:46 Dose:  100 mls


Levothyroxine Sodium (Synthroid)  75 mcg PO 0600 [facility]


   Last Admin: 04/08/18 05:06 Dose:  75 mcg


Miscellaneous Medication (Pharmacy To Dose)  1 each IVPB PRN PRN


   PRN Reason: Pharmacy to dose


Nadolol (Corgard)  40 mg PO DAILY [facility]


   Last Admin: 04/08/18 08:49 Dose:  40 mg


Ondansetron HCl (Zofran Odt)  4 mg PO Q6H PRN


   PRN Reason: Nausea/Vomiting


Ondansetron HCl (Zofran)  4 mg IVP Q6H PRN


   PRN Reason: Nausea/Vomiting


Pantoprazole Sodium (Protonix)  40 mg PO DAILY [facility]


   Last Admin: 04/08/18 08:49 Dose:  40 mg


Simvastatin (Zocor)  5 mg PO HS [facility]


   Last Admin: 04/07/18 21:38 Dose:  5 mg


Sodium Chloride (Flush - Normal Saline)  10 ml IVF Q12HR [facility]


   Last Admin: 04/08/18 08:49 Dose:  Not Given


Sodium Chloride (Flush - Normal Saline)  10 ml IVF PRN PRN


   PRN Reason: Saline Flush


   Last Admin: 04/05/18 23:04 Dose:  10 ml


Tramadol HCl (Ultram)  50 mg PO Q4H PRN


   PRN Reason: Pain


   Last Admin: 04/06/18 20:58 Dose:  50 mg

## 2018-04-09 LAB
ANION GAP SERPL CALC-SCNC: 12 MMOL/L (ref 10–20)
BASOPHILS # BLD AUTO: 0 THOU/UL (ref 0–0.2)
BASOPHILS NFR BLD AUTO: 0.2 % (ref 0–1)
BUN SERPL-MCNC: 13 MG/DL (ref 9.8–20.1)
CALCIUM SERPL-MCNC: 8.5 MG/DL (ref 7.8–10.44)
CHLORIDE SERPL-SCNC: 105 MMOL/L (ref 98–107)
CO2 SERPL-SCNC: 25 MMOL/L (ref 23–31)
CREAT CL PREDICTED SERPL C-G-VRATE: 59 ML/MIN (ref 70–130)
EOSINOPHIL # BLD AUTO: 0.1 THOU/UL (ref 0–0.7)
EOSINOPHIL NFR BLD AUTO: 0.6 % (ref 0–10)
GLUCOSE SERPL-MCNC: 113 MG/DL (ref 83–110)
HGB BLD-MCNC: 14 G/DL (ref 12–16)
LYMPHOCYTES # BLD: 2.2 THOU/UL (ref 1.2–3.4)
LYMPHOCYTES NFR BLD AUTO: 20.6 % (ref 21–51)
MCH RBC QN AUTO: 31.3 PG (ref 27–31)
MCV RBC AUTO: 96.6 FL (ref 81–99)
MONOCYTES # BLD AUTO: 1 THOU/UL (ref 0.11–0.59)
MONOCYTES NFR BLD AUTO: 9.5 % (ref 0–10)
NEUTROPHILS # BLD AUTO: 7.3 THOU/UL (ref 1.4–6.5)
NEUTROPHILS NFR BLD AUTO: 69.1 % (ref 42–75)
PLATELET # BLD AUTO: 246 THOU/UL (ref 130–400)
POTASSIUM SERPL-SCNC: 3.2 MMOL/L (ref 3.5–5.1)
RBC # BLD AUTO: 4.46 MILL/UL (ref 4.2–5.4)
SODIUM SERPL-SCNC: 139 MMOL/L (ref 136–145)
WBC # BLD AUTO: 10.6 THOU/UL (ref 4.8–10.8)

## 2018-04-09 RX ADMIN — Medication PRN ML: at 05:18

## 2018-04-09 RX ADMIN — LACTOBACILLUS ACIDOPH-L.BULGARICUS 1 MILLION CELL CHEWABLE TABLET SCH TAB: at 09:23

## 2018-04-09 RX ADMIN — LACTOBACILLUS ACIDOPH-L.BULGARICUS 1 MILLION CELL CHEWABLE TABLET SCH: at 22:18

## 2018-04-09 RX ADMIN — Medication SCH ML: at 22:19

## 2018-04-09 RX ADMIN — Medication SCH ML: at 09:24

## 2018-04-09 RX ADMIN — LACTOBACILLUS ACIDOPH-L.BULGARICUS 1 MILLION CELL CHEWABLE TABLET SCH TAB: at 18:01

## 2018-04-09 NOTE — PDOC.PN
- Subjective


Encounter Start Date: 04/09/18


Encounter Start Time: 10:30





Patient seen and examined. No new complaints. No overnight events





- Objective


Resuscitation Status: 


 











Resuscitation Status           DNR:Do Not Resuscitate














MAR Reviewed: Yes


Vital Signs & Weight: 


 Vital Signs (12 hours)











  Temp Pulse Resp BP BP Pulse Ox


 


 04/09/18 20:00  98.0 F  101 H  20   134/81  90 L


 


 04/09/18 13:20  98.5 F  90  20  108/71   93 L


 


 04/09/18 12:00  97.8 F  79  18  121/75   95








 Weight











Admit Weight                   145 lb 4 oz


 


Weight                         145 lb 4 oz














I&O: 


 











 04/08/18 04/09/18 04/10/18





 06:59 06:59 06:59


 


Intake Total 1140 1610 


 


Output Total 400 3103 550


 


Balance 484 -7247 -550











Result Diagrams: 


 04/10/18 04:49





 04/10/18 04:49


EKG Reviewed by me: Yes (Tele Afib)





Phys Exam





- Physical Examination


Constitutional: NAD


Respiratory: no wheezing, no rhonchi


Cardiovascular: no rub, irregular


Gastrointestinal: soft, non-tender, positive bowel sounds


Musculoskeletal: no edema


Neurological: moves all 4 limbs





Dx/Plan





- Plan


DVT proph w/lovenox, DVT proph w/SCDs





IMPRESSION:


1.  Sepsis with acute organ dysfunction duet to acute cholangitis/?acute 

cholecystitis.


2.  Hypokalemia


3.  Chronic atrial fibrillation


4.  Hypertension.


5. 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  

STAGE I (Present of admission)


6. Other issues per previous notes





PLAN:  


* Change Atbx to Ceftriaxone based on culture


* Replace Potassium


* AM labs


* DC planning


* Surg following











                                                            





Review of Systems





- Review of Systems


Respiratory: negative: Cough, Dry, Shortness of Breath, Hemoptysis, SOB with 

Excertion, Pleuritic Pain, Sputum, Wheezing


Cardiovascular: negative: chest pain, palpitations, orthopnea, paroxysmal 

nocturnal dyspnea, edema, light headedness, other


Gastrointestinal: negative: Nausea, Vomiting, Abdominal Pain, Diarrhea, 

Constipation, Melena, Hematochezia, Other





- Medications/Allergies


Allergies/Adverse Reactions: 


 Allergies











Allergy/AdvReac Type Severity Reaction Status Date / Time


 


codeine Allergy   Verified 07/21/14 20:16











Medications: 


 Current Medications





Acidophilus (Floranex)  1 tab PO TID [facility]


   Last Admin: 04/09/18 18:01 Dose:  1 tab


Aspirin (Aspirin Chewable)  81 mg PO DAILY [facility]


   Last Admin: 04/09/18 09:23 Dose:  81 mg


Benzonatate (Tessalon)  200 mg PO TIDPRN PRN


   PRN Reason: Cough


Calcium Carbonate (Tums)  1,000 mg PO Q4H PRN


   PRN Reason: Heartburn  or Indigestion


Diltiazem HCl (Cardizem)  30 mg PO Q6H [facility]


   Last Admin: 04/09/18 18:10 Dose:  30 mg


Furosemide (Lasix)  20 mg PO DAILY [facility]


   Last Admin: 04/09/18 09:23 Dose:  20 mg


Hydralazine HCl (Apresoline)  10 mg SLOW IVP Q4H PRN


   PRN Reason: SBP Greater Than 180


Piperacillin Sod/Tazobactam (Sod 3.375 gm/ Sodium Chloride)  100 mls @ 200 mls/

hr IVPB Q6HR [facility]


   Last Admin: 04/09/18 18:06 Dose:  100 mls


Levothyroxine Sodium (Synthroid)  75 mcg PO 0600 [facility]


   Last Admin: 04/09/18 05:17 Dose:  75 mcg


Miscellaneous Medication (Pharmacy To Dose)  1 each IVPB PRN PRN


   PRN Reason: Pharmacy to dose


Nadolol (Corgard)  40 mg PO DAILY [facility]


   Last Admin: 04/09/18 09:24 Dose:  40 mg


Ondansetron HCl (Zofran Odt)  4 mg PO Q6H PRN


   PRN Reason: Nausea/Vomiting


Ondansetron HCl (Zofran)  4 mg IVP Q6H PRN


   PRN Reason: Nausea/Vomiting


Pantoprazole Sodium (Protonix)  40 mg PO DAILY [facility]


   Last Admin: 04/09/18 09:24 Dose:  40 mg


Simvastatin (Zocor)  5 mg PO HS [facility]


   Last Admin: 04/08/18 21:04 Dose:  5 mg


Sodium Chloride (Flush - Normal Saline)  10 ml IVF Q12HR [facility]


   Last Admin: 04/09/18 09:24 Dose:  10 ml


Sodium Chloride (Flush - Normal Saline)  10 ml IVF PRN PRN


   PRN Reason: Saline Flush


   Last Admin: 04/09/18 05:18 Dose:  10 ml


Tramadol HCl (Ultram)  50 mg PO Q4H PRN


   PRN Reason: Pain


   Last Admin: 04/06/18 20:58 Dose:  50 mg

## 2018-04-10 LAB
ALBUMIN SERPL BCG-MCNC: 2.8 G/DL (ref 3.4–4.8)
ALP SERPL-CCNC: 151 U/L (ref 40–150)
ALT SERPL W P-5'-P-CCNC: 28 U/L (ref 8–55)
ANALYZER IN CARDIO: (no result)
ANION GAP SERPL CALC-SCNC: 10 MMOL/L (ref 10–20)
AST SERPL-CCNC: 17 U/L (ref 5–34)
BASE EXCESS STD BLDA CALC-SCNC: 0.2 MEQ/L
BASOPHILS # BLD AUTO: 0 THOU/UL (ref 0–0.2)
BASOPHILS NFR BLD AUTO: 0 % (ref 0–1)
BILIRUB SERPL-MCNC: 2.5 MG/DL (ref 0.2–1.2)
BUN SERPL-MCNC: 11 MG/DL (ref 9.8–20.1)
CA-I BLDA-SCNC: 1.1 MMOL/L (ref 1.12–1.3)
CALCIUM SERPL-MCNC: 8.3 MG/DL (ref 7.8–10.44)
CHLORIDE SERPL-SCNC: 107 MMOL/L (ref 98–107)
CO2 SERPL-SCNC: 28 MMOL/L (ref 23–31)
CREAT CL PREDICTED SERPL C-G-VRATE: 62 ML/MIN (ref 70–130)
CRYSTAL-AUWI FLAG: 0.2 (ref 0–15)
EOSINOPHIL # BLD AUTO: 0.1 THOU/UL (ref 0–0.7)
EOSINOPHIL NFR BLD AUTO: 0.8 % (ref 0–10)
GLOBULIN SER CALC-MCNC: 3.5 G/DL (ref 2.4–3.5)
GLUCOSE SERPL-MCNC: 82 MG/DL (ref 83–110)
HCO3 BLDA-SCNC: 25.6 MEQ/L (ref 22–26)
HCT VFR BLDA CALC: 42.6 % (ref 36–47)
HEV IGM SER QL: 2.6 (ref 0–7.99)
HGB BLD-MCNC: 14.3 G/DL (ref 12–16)
HGB BLDA-MCNC: 13.8 G/DL (ref 12–16)
HYALINE CASTS #/AREA URNS LPF: (no result) LPF
LYMPHOCYTES # BLD: 2.5 THOU/UL (ref 1.2–3.4)
LYMPHOCYTES NFR BLD AUTO: 26.5 % (ref 21–51)
MAGNESIUM SERPL-MCNC: 1.7 MG/DL (ref 1.6–2.6)
MCH RBC QN AUTO: 31.1 PG (ref 27–31)
MCV RBC AUTO: 96.2 FL (ref 81–99)
MONOCYTES # BLD AUTO: 1 THOU/UL (ref 0.11–0.59)
MONOCYTES NFR BLD AUTO: 10.2 % (ref 0–10)
NEUTROPHILS # BLD AUTO: 5.8 THOU/UL (ref 1.4–6.5)
NEUTROPHILS NFR BLD AUTO: 62.5 % (ref 42–75)
PATHC CAST-AUWI FLAG: 0 (ref 0–2.49)
PCO2 BLDA: 44.6 MMHG (ref 35–45)
PH BLDA: 7.38 [PH] (ref 7.35–7.45)
PLATELET # BLD AUTO: 291 THOU/UL (ref 130–400)
PO2 BLDA: 87 MMHG (ref 80–100)
POTASSIUM SERPL-SCNC: 3.4 MMOL/L (ref 3.5–5.1)
PROT UR STRIP.AUTO-MCNC: 30 MG/DL
RBC # BLD AUTO: 4.58 MILL/UL (ref 4.2–5.4)
RBC UR QL AUTO: (no result) HPF (ref 0–3)
SODIUM SERPL-SCNC: 142 MMOL/L (ref 136–145)
SP GR UR STRIP: 1.03 (ref 1–1.04)
SPECIMEN DRAWN FROM PATIENT: (no result)
SPERM-AUWI FLAG: 0 (ref 0–9.9)
TROPONIN I SERPL DL<=0.01 NG/ML-MCNC: (no result) NG/ML (ref ?–0.03)
WBC # BLD AUTO: 9.3 THOU/UL (ref 4.8–10.8)
WBC UR QL AUTO: (no result) HPF (ref 0–3)
YEAST-AUWI FLAG: 0 (ref 0–25)

## 2018-04-10 PROCEDURE — 02HV33Z INSERTION OF INFUSION DEVICE INTO SUPERIOR VENA CAVA, PERCUTANEOUS APPROACH: ICD-10-PCS | Performed by: RADIOLOGY

## 2018-04-10 RX ADMIN — POTASSIUM CHLORIDE, DEXTROSE MONOHYDRATE AND SODIUM CHLORIDE SCH MLS: 150; 5; 450 INJECTION, SOLUTION INTRAVENOUS at 22:41

## 2018-04-10 RX ADMIN — LACTOBACILLUS ACIDOPH-L.BULGARICUS 1 MILLION CELL CHEWABLE TABLET SCH: at 11:50

## 2018-04-10 RX ADMIN — LACTOBACILLUS ACIDOPH-L.BULGARICUS 1 MILLION CELL CHEWABLE TABLET SCH: at 22:42

## 2018-04-10 RX ADMIN — POTASSIUM CHLORIDE, DEXTROSE MONOHYDRATE AND SODIUM CHLORIDE SCH: 150; 5; 450 INJECTION, SOLUTION INTRAVENOUS at 18:43

## 2018-04-10 RX ADMIN — LACTOBACILLUS ACIDOPH-L.BULGARICUS 1 MILLION CELL CHEWABLE TABLET SCH: at 15:01

## 2018-04-10 RX ADMIN — Medication SCH ML: at 11:51

## 2018-04-10 RX ADMIN — Medication SCH: at 22:42

## 2018-04-10 RX ADMIN — POTASSIUM CHLORIDE, DEXTROSE MONOHYDRATE AND SODIUM CHLORIDE SCH MLS: 150; 5; 450 INJECTION, SOLUTION INTRAVENOUS at 11:49

## 2018-04-10 NOTE — RAD
SINGLE VIEW OF THE CHEST:

 

Comparison: 10-4-12

 

History: Code Green. Shortness of breath. 

 

FINDINGS: 

Single view of the chest shows an enlarged cardiomediastinal silhouette. There are small bilateral pl
eural effusions with adjacent atelectasis versus infiltrates. Increased interstitial markings are pre
sent. 

 

IMPRESSION: 

1. Cardiomegaly. 

2. Bilateral pleural effusions with adjacent atelectasis versus infiltrate. 

 

POS: [facility]

## 2018-04-10 NOTE — PDOC.PN
- Subjective


Encounter Start Date: 04/10/18


Encounter Start Time: 10:00


-: non-verbal





Patient seen and examined. Mental worsened overnight. Now not following 

commands. 





- Objective


Resuscitation Status: 


 











Resuscitation Status           DNR:Do Not Resuscitate














MAR Reviewed: Yes


Vital Signs & Weight: 


 Vital Signs (12 hours)











  Temp Temp Pulse Pulse Resp BP BP


 


 04/10/18 16:00  97.3 F L   97   20   134/84


 


 04/10/18 10:24   97.6 F   96   136/83 














  Pulse Ox Pulse Ox


 


 04/10/18 16:00  96 


 


 04/10/18 10:24   95








 Weight











Admit Weight                   145 lb 4 oz


 


Weight                         145 lb 4 oz














I&O: 


 











 04/09/18 04/10/18 04/11/18





 06:59 06:59 06:59


 


Intake Total 1610 100 500


 


Output Total 2895 980 870


 


Balance -1285 -880 -370











Result Diagrams: 


 04/10/18 04:49





 04/10/18 04:49


Additional Labs: 


 Accuchecks











  04/10/18 04/10/18 04/10/18





  20:19 16:09 10:33


 


POC Glucose  133 H  86  74











Radiology Reviewed by me: Yes (CXR - ?infiltrate, CT brain - neg)





Phys Exam





- Physical Examination


Pt is encephalopathic


Neck: no JVD


Respiratory: no wheezing, no rhonchi


Scat rales at bases, Symmetrical


Cardiovascular: RRR, no rub


No heaves/pulsations


Gastrointestinal: soft, non-tender, no distention, positive bowel sounds


Musculoskeletal: no edema


Neuro/Psych - pt is not following commands, No hypertonia/rigidity


Skin: no rash





Dx/Plan





- Plan


plan discussed w/ family, PT/OT, , respiratory therapy, DVT 

proph w/lovenox, DVT proph w/SCDs





IMPRESSION:


1.  Sepsis with acute organ dysfunction duet to acute cholangitis/?acute 

cholecystitis.


2.  Toxic Metabolic Encephalopathy - multifactorial


3.  ?Aspiration Pneumonia 


4.  Chronic atrial fibrillation


5.  Hypertension.


6. 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  

STAGE I (Present of admission)


7. Hypokalemia / Other issues per previous notes





PLAN:  


* Code green called due to worsening mentation - CT brain negative, CXR ?

infiltrate


* Resume anticoag for Afib - I d/w Dr Carey who agrees with anticoag initiation


* PICC line placement


* Change Atbx to Meropenem


* Stat labs ordered.


* AM labs


* DC planning


* Surg following








Review of Systems





- Review of Systems


Other: 





Cannot obtain due to current mentation





- Medications/Allergies


Allergies/Adverse Reactions: 


 Allergies











Allergy/AdvReac Type Severity Reaction Status Date / Time


 


codeine Allergy   Verified 07/21/14 20:16











Medications: 


 Current Medications





Acidophilus (Floranex)  1 tab PO TID [facility]


   Last Admin: 04/10/18 15:01 Dose:  Not Given


Aspirin (Aspirin Chewable)  81 mg PO DAILY [facility]


   Last Admin: 04/10/18 11:50 Dose:  Not Given


Benzonatate (Tessalon)  200 mg PO TIDPRN PRN


   PRN Reason: Cough


Calcium Carbonate (Tums)  1,000 mg PO Q4H PRN


   PRN Reason: Heartburn  or Indigestion


Enoxaparin Sodium (Lovenox)  60 mg SC 0900,2100 [facility]


Hydralazine HCl (Apresoline)  10 mg SLOW IVP Q4H PRN


   PRN Reason: SBP Greater Than 180


Potassium Chloride/Dextrose/Sod Cl (D5 1/2 Ns W/20 Meq Kcl)  1,000 mls @ 125 mls

/hr IV .Q8H [facility]


   Last Admin: 04/10/18 18:43 Dose:  Not Given


Meropenem 1 gm/ Sodium (Chloride)  100 mls @ 200 mls/hr IVPB Q8HR [facility]


   Last Admin: 04/10/18 16:15 Dose:  100 mls


Labetalol HCl (Normodyne)  10 mg SLOW IVP Q4H PRN


   PRN Reason: Systolic BP > 160


Levothyroxine Sodium (Synthroid)  40 mcg IVP 0600 [facility]


Miscellaneous Medication (Pharmacy To Dose)  1 each IVPB PRN PRN


   PRN Reason: Pharmacy to dose


Nadolol (Corgard)  40 mg PO DAILY [facility]


   Last Admin: 04/10/18 11:50 Dose:  Not Given


Ondansetron HCl (Zofran Odt)  4 mg PO Q6H PRN


   PRN Reason: Nausea/Vomiting


Ondansetron HCl (Zofran)  4 mg IVP Q6H PRN


   PRN Reason: Nausea/Vomiting


Pantoprazole Sodium (Protonix)  40 mg IVP DAILY [facility]


Potassium Chloride (K-Dur)  20 meq PO QAM-WM [facility]


   Last Admin: 04/10/18 11:50 Dose:  Not Given


Simvastatin (Zocor)  5 mg PO HS [facility]


   Last Admin: 04/09/18 22:18 Dose:  Not Given


Sodium Chloride (Flush - Normal Saline)  10 ml IVF Q12HR [facility]


   Last Admin: 04/10/18 11:51 Dose:  10 ml


Sodium Chloride (Flush - Normal Saline)  10 ml IVF PRN PRN


   PRN Reason: Saline Flush


   Last Admin: 04/09/18 05:18 Dose:  10 ml


Tramadol HCl (Ultram)  50 mg PO Q4H PRN


   PRN Reason: Pain


   Last Admin: 04/06/18 20:58 Dose:  50 mg

## 2018-04-10 NOTE — CT
CT BRAIN WITHOUT COTNRAST:

 

COMPARISON: 

None.

 

HISTORY: 

Change of level of consciousness.  Evaluate for stroke.

 

TECHNIQUE: 

Multiple contiguous axial images were obtained in a CT of the brain without contrast.

 

FINDINGS: 

There are scattered hypodensities in the subcortical and periventricular white matter, likely seconda
ry to small-vessel ischemic disease.  No large confluent infarction is seen.  There is no evidence of
 hydrocephalus, intracranial hemorrhage, or extraaxial fluid collection.

 

The calvarium and overlying soft tissues are unremarkable.  The visualized paranasal sinuses and mast
oid air cells are well aerated.

 

IMPRESSION: 

1.  No evidence of acute intracranial abnormality.

 

2.  Extensive small-vessel ischemic disease.

 

POS: [facility]

## 2018-04-10 NOTE — CT
CT GUIDED SUBCUTANEOUS CHOLECYSTOTOMY:

 

HISTORY: 

Acute cholecystitis.  Poor surgical candidate. 

 

FINDINGS: 

After explaining the procedure and answering all questions, limited CT imaging of the upper abdomen w
as performed.  Anterior right upper quadrant approach as planned.  Sterile technique, buffered local 
anesthesia, CT guidance, and an anterolateral approach were used to carefully advance the tip of 22-g
auge needle into the gallbladder lumen.  While attempt was made to achieve purchase through the hepat
ic parenchyma, the gallbladder anatomy, position, and patient inability to cooperate with respiration
 of breathing precluded good hepatic parenchyma purchase approach.

 

Acupuncture system technique was then used to place a 0.035 Amplatz wire.  The tract was dilated to 8
 Ecuadorean and an 8 Ecuadorean locking loop Uresil catheter was placed in the gallbladder lumen.  Approximat
ely 20 cc of thick greenish liquid was aspirated.  A portion was sent to pathology for evaluation.  T
he catheter was secured externally with 2-0 Ethilon suture and left draining to gravity.  The patient
 tolerate the procedure well and was returned in improved condition.

 

IMPRESSION: 

Technically successful CT-guided cholecystostomy.  Pathology is pending.

## 2018-04-10 NOTE — SPC
EXAM:

LEFT UPPER EXTREMITY PICC LINE PLACEMENT WITH ULTRASOUND GUIDANCE.

4/10/18

 

HISTORY: 

Infection. IV access for antibiotics is required.

 

COMPARISON:  

None.

 

EXPOSURE:

0.8 minutes. 4261 mGy*cm2.

 

FINDINGS:  

Successful left upper extremity PICC line placement with ultrasound guidance. Distal tip is in the ri
ght atrium. Both lumens flush and aspirate without difficulty. Trim length is 42 cm. 

 

TECHNIQUE:  

Consent obtained to perform an ultrasound guided left upper extremity PICC line. Left arm was prepped
 and draped in the sterile fashion. 1% lidocaine, buffered with sodium bicarbonate was used for local
 anesthesia. Under ultrasound guidance, micropuncture needle was used to cannulate the basilic vein. 
A 0.018 inch guide wire was advanced through the needle to the level of the superior vena cava. Under
 fluoroscopy, wire was advanced to the inferior vena cava to document venous access. Wire was subsequ
ently pulled back to the right atrium. Trace is dilated. Dual lumen 5 Y[place]ut catheter was advanced ov
er the wire. Wire was removed. Both lumen flush and aspirate without difficulty. Trim length is 42 cm
.

 

IMPRESSION:  

Successful left upper extremity PICC line placement with ultrasound guidance. 

 

POS: [facility]

## 2018-04-11 LAB
ALBUMIN SERPL BCG-MCNC: 2.7 G/DL (ref 3.4–4.8)
ALP SERPL-CCNC: 148 U/L (ref 40–150)
ALT SERPL W P-5'-P-CCNC: 21 U/L (ref 8–55)
ANION GAP SERPL CALC-SCNC: 11 MMOL/L (ref 10–20)
AST SERPL-CCNC: 15 U/L (ref 5–34)
BASOPHILS # BLD AUTO: 0 THOU/UL (ref 0–0.2)
BASOPHILS NFR BLD AUTO: 0.1 % (ref 0–1)
BILIRUB SERPL-MCNC: 1.8 MG/DL (ref 0.2–1.2)
BUN SERPL-MCNC: 8 MG/DL (ref 9.8–20.1)
CALCIUM SERPL-MCNC: 8 MG/DL (ref 7.8–10.44)
CHLORIDE SERPL-SCNC: 108 MMOL/L (ref 98–107)
CO2 SERPL-SCNC: 24 MMOL/L (ref 23–31)
CREAT CL PREDICTED SERPL C-G-VRATE: 70 ML/MIN (ref 70–130)
CRP SERPL-MCNC: 5.51 MG/DL
EOSINOPHIL # BLD AUTO: 0.1 THOU/UL (ref 0–0.7)
EOSINOPHIL NFR BLD AUTO: 1.1 % (ref 0–10)
GLOBULIN SER CALC-MCNC: 3.5 G/DL (ref 2.4–3.5)
GLUCOSE SERPL-MCNC: 161 MG/DL (ref 83–110)
HGB BLD-MCNC: 14.6 G/DL (ref 12–16)
LYMPHOCYTES # BLD: 1.8 THOU/UL (ref 1.2–3.4)
LYMPHOCYTES NFR BLD AUTO: 21.8 % (ref 21–51)
MAGNESIUM SERPL-MCNC: 1.8 MG/DL (ref 1.6–2.6)
MCH RBC QN AUTO: 32.2 PG (ref 27–31)
MCV RBC AUTO: 101 FL (ref 81–99)
MONOCYTES # BLD AUTO: 0.8 THOU/UL (ref 0.11–0.59)
MONOCYTES NFR BLD AUTO: 10.1 % (ref 0–10)
NEUTROPHILS # BLD AUTO: 5.5 THOU/UL (ref 1.4–6.5)
NEUTROPHILS NFR BLD AUTO: 66.9 % (ref 42–75)
PLATELET # BLD AUTO: 322 THOU/UL (ref 130–400)
POTASSIUM SERPL-SCNC: 4.3 MMOL/L (ref 3.5–5.1)
RBC # BLD AUTO: 4.54 MILL/UL (ref 4.2–5.4)
SODIUM SERPL-SCNC: 139 MMOL/L (ref 136–145)
WBC # BLD AUTO: 8.2 THOU/UL (ref 4.8–10.8)

## 2018-04-11 RX ADMIN — Medication SCH ML: at 20:37

## 2018-04-11 RX ADMIN — Medication SCH ML: at 09:22

## 2018-04-11 RX ADMIN — POTASSIUM CHLORIDE, DEXTROSE MONOHYDRATE AND SODIUM CHLORIDE SCH MLS: 150; 5; 450 INJECTION, SOLUTION INTRAVENOUS at 09:22

## 2018-04-11 RX ADMIN — POTASSIUM CHLORIDE, DEXTROSE MONOHYDRATE AND SODIUM CHLORIDE SCH: 150; 5; 450 INJECTION, SOLUTION INTRAVENOUS at 18:34

## 2018-04-11 RX ADMIN — LEVOTHYROXINE SODIUM ANHYDROUS SCH MCG: 100 INJECTION, POWDER, LYOPHILIZED, FOR SOLUTION INTRAVENOUS at 06:34

## 2018-04-11 RX ADMIN — LACTOBACILLUS ACIDOPH-L.BULGARICUS 1 MILLION CELL CHEWABLE TABLET SCH: at 15:29

## 2018-04-11 RX ADMIN — LACTOBACILLUS ACIDOPH-L.BULGARICUS 1 MILLION CELL CHEWABLE TABLET SCH: at 10:36

## 2018-04-11 RX ADMIN — LACTOBACILLUS ACIDOPH-L.BULGARICUS 1 MILLION CELL CHEWABLE TABLET SCH TAB: at 20:37

## 2018-04-11 RX ADMIN — POTASSIUM CHLORIDE, DEXTROSE MONOHYDRATE AND SODIUM CHLORIDE SCH MLS: 150; 5; 450 INJECTION, SOLUTION INTRAVENOUS at 16:54

## 2018-04-11 NOTE — PDOC.PN
- Subjective


Encounter Start Date: 04/11/18


Encounter Start Time: 10:30





Patient seen and examined. No new complaints. No overnight events. Mentation 

improving. Awake





- Objective


Resuscitation Status: 


 











Resuscitation Status           DNR:Do Not Resuscitate














MAR Reviewed: Yes


Vital Signs & Weight: 


 Vital Signs (12 hours)











  Temp Pulse Resp BP Pulse Ox


 


 04/11/18 16:30  97.6 F  99  16  150/101 H  96


 


 04/11/18 12:36  97.2 F L  114 H  20  149/94 H  97


 


 04/11/18 08:00  97.3 F L  99  20  165/95 H  97








 Weight











Admit Weight                   145 lb 4 oz


 


Weight                         145 lb 4 oz














I&O: 


 











 04/10/18 04/11/18 04/12/18





 06:59 06:59 06:59


 


Intake Total 100 500 


 


Output Total 980 870 175


 


Balance -880 -370 -175











Result Diagrams: 


 04/11/18 09:58





 04/11/18 09:58


Additional Labs: 


 Accuchecks











  04/11/18 04/11/18 04/11/18





  15:59 11:38 06:51


 


POC Glucose  158 H  154 H  160 H














  04/10/18





  20:19


 


POC Glucose  133 H














Phys Exam





- Physical Examination


Constitutional: NAD


Respiratory: no wheezing, no rhonchi


Scat rales at bases


Cardiovascular: RRR, no rub


Gastrointestinal: soft, non-tender, positive bowel sounds


Musculoskeletal: no edema


Neurological: moves all 4 limbs


Awake





Dx/Plan





- Plan


DVT proph w/lovenox, DVT proph w/SCDs





IMPRESSION:


1.  Sepsis with acute organ dysfunction due to acute cholangitis/?acute 

cholecystitis s/p Cholecystomy 


2.  Toxic Metabolic Encephalopathy - multifactorial


3.  ?Aspiration Pneumonia 


4.  Chronic atrial fibrillation - on Lovenox


5.  Hypertension.


6. 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  

STAGE I (Present of admission)


7. Hypokalemia / s/p Code green/Other issues per previous notes





PLAN:  


* s/p PICC line


* Cont Meropenem


* AM labs


* DC planning


* Surg/GI following


* DC planning in 24-48 hr if stable











Review of Systems





- Review of Systems


Other: 





Cannot be reliably obtained due to current mentation





- Medications/Allergies


Allergies/Adverse Reactions: 


 Allergies











Allergy/AdvReac Type Severity Reaction Status Date / Time


 


codeine Allergy   Verified 07/21/14 20:16











Medications: 


 Current Medications





Acidophilus (Floranex)  1 tab PO TID [facility]


   Last Admin: 04/11/18 15:29 Dose:  Not Given


Aspirin (Aspirin Chewable)  81 mg PO DAILY [facility]


   Last Admin: 04/11/18 10:36 Dose:  Not Given


Benzonatate (Tessalon)  200 mg PO TIDPRN PRN


   PRN Reason: Cough


Calcium Carbonate (Tums)  1,000 mg PO Q4H PRN


   PRN Reason: Heartburn  or Indigestion


Enoxaparin Sodium (Lovenox)  60 mg SC 0900,2100 [facility]


   Last Admin: 04/11/18 09:20 Dose:  60 mg


Hydralazine HCl (Apresoline)  10 mg SLOW IVP Q4H PRN


   PRN Reason: SBP Greater Than 180


Meropenem 1 gm/ Sodium (Chloride)  100 mls @ 200 mls/hr IVPB Q8HR [facility]


   Last Admin: 04/11/18 14:05 Dose:  100 mls


Potassium Chloride/Dextrose/Sod Cl (D5 1/2 Ns W/20 Meq Kcl)  1,000 mls @ 100 mls

/hr IV .Q10H [facility]


Labetalol HCl (Normodyne)  10 mg SLOW IVP Q4H PRN


   PRN Reason: Systolic BP > 160


Levothyroxine Sodium (Synthroid)  40 mcg IVP 0600 [facility]


   Last Admin: 04/11/18 06:34 Dose:  40 mcg


Miscellaneous Medication (Pharmacy To Dose)  1 each IVPB PRN PRN


   PRN Reason: Pharmacy to dose


Nadolol (Corgard)  40 mg PO DAILY [facility]


   Last Admin: 04/11/18 10:36 Dose:  Not Given


Ondansetron HCl (Zofran Odt)  4 mg PO Q6H PRN


   PRN Reason: Nausea/Vomiting


Ondansetron HCl (Zofran)  4 mg IVP Q6H PRN


   PRN Reason: Nausea/Vomiting


Pantoprazole Sodium (Protonix)  40 mg IVP DAILY [facility]


   Last Admin: 04/11/18 09:21 Dose:  40 mg


Potassium Chloride (K-Dur)  20 meq PO QAM-WM [facility]


   Last Admin: 04/11/18 10:35 Dose:  Not Given


Simvastatin (Zocor)  5 mg PO HS [facility]


   Last Admin: 04/10/18 22:42 Dose:  Not Given


Sodium Chloride (Flush - Normal Saline)  10 ml IVF Q12HR [facility]


   Last Admin: 04/11/18 09:22 Dose:  10 ml


Sodium Chloride (Flush - Normal Saline)  10 ml IVF PRN PRN


   PRN Reason: Saline Flush


   Last Admin: 04/09/18 05:18 Dose:  10 ml


Tramadol HCl (Ultram)  50 mg PO Q4H PRN


   PRN Reason: Pain


   Last Admin: 04/06/18 20:58 Dose:  50 mg

## 2018-04-12 LAB
ALBUMIN SERPL BCG-MCNC: 2.6 G/DL (ref 3.4–4.8)
ALP SERPL-CCNC: 151 U/L (ref 40–150)
ALT SERPL W P-5'-P-CCNC: 21 U/L (ref 8–55)
ANION GAP SERPL CALC-SCNC: 12 MMOL/L (ref 10–20)
AST SERPL-CCNC: 23 U/L (ref 5–34)
BASOPHILS # BLD AUTO: 0 THOU/UL (ref 0–0.2)
BASOPHILS NFR BLD AUTO: 0.5 % (ref 0–1)
BILIRUB SERPL-MCNC: 1.8 MG/DL (ref 0.2–1.2)
BUN SERPL-MCNC: 6 MG/DL (ref 9.8–20.1)
CALCIUM SERPL-MCNC: 8.1 MG/DL (ref 7.8–10.44)
CHLORIDE SERPL-SCNC: 107 MMOL/L (ref 98–107)
CO2 SERPL-SCNC: 22 MMOL/L (ref 23–31)
CREAT CL PREDICTED SERPL C-G-VRATE: 74 ML/MIN (ref 70–130)
EOSINOPHIL # BLD AUTO: 0.1 THOU/UL (ref 0–0.7)
EOSINOPHIL NFR BLD AUTO: 1 % (ref 0–10)
GLOBULIN SER CALC-MCNC: 3.5 G/DL (ref 2.4–3.5)
GLUCOSE SERPL-MCNC: 128 MG/DL (ref 83–110)
HGB BLD-MCNC: 14.2 G/DL (ref 12–16)
LYMPHOCYTES # BLD: 2.4 THOU/UL (ref 1.2–3.4)
LYMPHOCYTES NFR BLD AUTO: 33.3 % (ref 21–51)
MAGNESIUM SERPL-MCNC: 1.8 MG/DL (ref 1.6–2.6)
MCH RBC QN AUTO: 30.9 PG (ref 27–31)
MCV RBC AUTO: 101 FL (ref 81–99)
MONOCYTES # BLD AUTO: 0.7 THOU/UL (ref 0.11–0.59)
MONOCYTES NFR BLD AUTO: 10.1 % (ref 0–10)
NEUTROPHILS # BLD AUTO: 4 THOU/UL (ref 1.4–6.5)
NEUTROPHILS NFR BLD AUTO: 55 % (ref 42–75)
PLATELET # BLD AUTO: 383 THOU/UL (ref 130–400)
POTASSIUM SERPL-SCNC: 4.2 MMOL/L (ref 3.5–5.1)
RBC # BLD AUTO: 4.59 MILL/UL (ref 4.2–5.4)
SODIUM SERPL-SCNC: 137 MMOL/L (ref 136–145)
WBC # BLD AUTO: 7.2 THOU/UL (ref 4.8–10.8)

## 2018-04-12 RX ADMIN — LACTOBACILLUS ACIDOPH-L.BULGARICUS 1 MILLION CELL CHEWABLE TABLET SCH TAB: at 20:58

## 2018-04-12 RX ADMIN — Medication SCH ML: at 21:06

## 2018-04-12 RX ADMIN — POTASSIUM CHLORIDE, DEXTROSE MONOHYDRATE AND SODIUM CHLORIDE SCH: 150; 5; 450 INJECTION, SOLUTION INTRAVENOUS at 12:01

## 2018-04-12 RX ADMIN — POTASSIUM CHLORIDE, DEXTROSE MONOHYDRATE AND SODIUM CHLORIDE SCH MLS: 150; 5; 450 INJECTION, SOLUTION INTRAVENOUS at 05:08

## 2018-04-12 RX ADMIN — LACTOBACILLUS ACIDOPH-L.BULGARICUS 1 MILLION CELL CHEWABLE TABLET SCH TAB: at 14:57

## 2018-04-12 RX ADMIN — LACTOBACILLUS ACIDOPH-L.BULGARICUS 1 MILLION CELL CHEWABLE TABLET SCH TAB: at 07:54

## 2018-04-12 RX ADMIN — POTASSIUM CHLORIDE, DEXTROSE MONOHYDRATE AND SODIUM CHLORIDE SCH: 150; 5; 450 INJECTION, SOLUTION INTRAVENOUS at 15:29

## 2018-04-12 RX ADMIN — Medication SCH: at 07:59

## 2018-04-12 RX ADMIN — LEVOTHYROXINE SODIUM ANHYDROUS SCH MCG: 100 INJECTION, POWDER, LYOPHILIZED, FOR SOLUTION INTRAVENOUS at 05:08

## 2018-04-12 NOTE — RAD
SINGLE VIEW CHEST:

 

Date:  04/12/18 

 

COMPARISON:  

04/10/18. 

 

HISTORY:  

Shortness of breath. Pneumonia. 

 

FINDINGS:

Single view of the chest shows an enlarged cardiomediastinal silhouette. There are small bilateral pl
eural effusions. Atelectasis versus an infiltrate is seen in the right lower lobe. There is a left up
per extremity PICC line with its tip in the superior vena cava. Atherosclerotic calcifications are se
en in the aorta. 

 

IMPRESSION: 

1.  Bilateral pleural effusions. 

2.  Cardiomegaly. 

 

 

POS: MATTHEW

## 2018-04-12 NOTE — PDOC.PN
- Subjective


Encounter Start Date: 04/12/18


Encounter Start Time: 14:30





Patient seen and examined. No new complaints. No overnight events





- Objective


Resuscitation Status: 


 











Resuscitation Status           DNR:Do Not Resuscitate














MAR Reviewed: Yes


Vital Signs & Weight: 


 Vital Signs (12 hours)











  Temp Pulse Resp BP Pulse Ox


 


 04/12/18 19:39  97.5 F L  107 H  20  131/85  98


 


 04/12/18 11:47  97.2 F L  97  20  127/84  96








 Weight











Admit Weight                   145 lb 4 oz


 


Weight                         145 lb 4 oz














I&O: 


 











 04/11/18 04/12/18 04/13/18





 06:59 06:59 06:59


 


Intake Total 500 1550 600


 


Output Total 870 825 375


 


Balance -370 725 225











Result Diagrams: 


 04/13/18 03:55





 04/13/18 03:55


Additional Labs: 


 Accuchecks











  04/12/18 04/12/18 04/12/18





  17:08 11:48 04:42


 


POC Glucose  111 H  110  124 H














  04/11/18





  20:47


 


POC Glucose  144 H











Radiology Reviewed by me: Yes (CXR - no new infiltrate)





Phys Exam





- Physical Examination


Constitutional: NAD


Respiratory: no wheezing, no rhonchi


Cardiovascular: RRR, no rub


Gastrointestinal: soft, non-tender, positive bowel sounds


Musculoskeletal: no edema


Neurological: moves all 4 limbs





Dx/Plan





- Plan


DVT proph w/lovenox, DVT proph w/SCDs





IMPRESSION:


1.  Sepsis with acute organ dysfunction due to acute cholangitis/acute 

cholecystitis s/p Cholecystomy 


2.  Toxic Metabolic Encephalopathy - multifactorial - improving


3.  ?Aspiration Pneumonia


4.  Chronic atrial fibrillation - on Lovenox


5.  Hypertension.


6. 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  

STAGE I (Present of admission)


7. Hypokalemia / s/p Code green/Other issues per previous notes





PLAN:  


* Poor appetite


* Will change Lovenox to Eliquis when able to take PO


* s/p PICC line


* Cont Meropenem


* AM labs


* DC planning


* Surg/GI following


* DC planning in 24-48 hr if stable


* Reducce IVF











Review of Systems





- Review of Systems


Other: 





Cannot obtain due to current cognition





- Medications/Allergies


Allergies/Adverse Reactions: 


 Allergies











Allergy/AdvReac Type Severity Reaction Status Date / Time


 


codeine Allergy   Verified 07/21/14 20:16











Medications: 


 Current Medications





Acidophilus (Floranex)  1 tab PO TID [facility]


   Last Admin: 04/12/18 14:57 Dose:  1 tab


Aspirin (Aspirin Chewable)  81 mg PO DAILY [facility]


   Last Admin: 04/12/18 07:54 Dose:  81 mg


Benzonatate (Tessalon)  200 mg PO TIDPRN PRN


   PRN Reason: Cough


Calcium Carbonate (Tums)  1,000 mg PO Q4H PRN


   PRN Reason: Heartburn  or Indigestion


Enoxaparin Sodium (Lovenox)  60 mg SC 0900,2100 [facility]


   Last Admin: 04/12/18 10:16 Dose:  60 mg


Hydralazine HCl (Apresoline)  10 mg SLOW IVP Q4H PRN


   PRN Reason: SBP Greater Than 180


Meropenem 1 gm/ Sodium (Chloride)  100 mls @ 200 mls/hr IVPB Q8HR [facility]


   Last Admin: 04/12/18 14:56 Dose:  100 mls


Potassium Chloride/Dextrose/Sod Cl (D5 1/2 Ns W/20 Meq Kcl)  1,000 mls @ 50 mls/

hr IV .Q20H [facility]


   Last Admin: 04/12/18 15:29 Dose:  Not Given


Labetalol HCl (Normodyne)  10 mg SLOW IVP Q4H PRN


   PRN Reason: Systolic BP > 160


Levothyroxine Sodium (Synthroid)  40 mcg IVP 0600 [facility]


   Last Admin: 04/12/18 05:08 Dose:  40 mcg


Miscellaneous Medication (Pharmacy To Dose)  1 each IVPB PRN PRN


   PRN Reason: Pharmacy to dose


Nadolol (Corgard)  40 mg PO DAILY [facility]


   Last Admin: 04/12/18 07:54 Dose:  40 mg


Ondansetron HCl (Zofran Odt)  4 mg PO Q6H PRN


   PRN Reason: Nausea/Vomiting


Ondansetron HCl (Zofran)  4 mg IVP Q6H PRN


   PRN Reason: Nausea/Vomiting


Pantoprazole Sodium (Protonix)  40 mg IVP DAILY [facility]


   Last Admin: 04/12/18 07:54 Dose:  40 mg


Simvastatin (Zocor)  5 mg PO HS [facility]


   Last Admin: 04/11/18 20:37 Dose:  5 mg


Sodium Chloride (Flush - Normal Saline)  10 ml IVF Q12HR [facility]


   Last Admin: 04/12/18 07:59 Dose:  Not Given


Sodium Chloride (Flush - Normal Saline)  10 ml IVF PRN PRN


   PRN Reason: Saline Flush


   Last Admin: 04/09/18 05:18 Dose:  10 ml


Tramadol HCl (Ultram)  50 mg PO Q4H PRN


   PRN Reason: Pain


   Last Admin: 04/06/18 20:58 Dose:  50 mg

## 2018-04-13 LAB
ALBUMIN SERPL BCG-MCNC: 2.7 G/DL (ref 3.4–4.8)
ANION GAP SERPL CALC-SCNC: 12 MMOL/L (ref 10–20)
BASOPHILS # BLD AUTO: 0 THOU/UL (ref 0–0.2)
BASOPHILS NFR BLD AUTO: 0.2 % (ref 0–1)
BUN SERPL-MCNC: 7 MG/DL (ref 9.8–20.1)
BUN/CREAT SERPL: 12.28
CALCIUM SERPL-MCNC: 8.3 MG/DL (ref 7.8–10.44)
CHLORIDE SERPL-SCNC: 107 MMOL/L (ref 98–107)
CO2 SERPL-SCNC: 21 MMOL/L (ref 23–31)
CREAT CL PREDICTED SERPL C-G-VRATE: 71 ML/MIN (ref 70–130)
EOSINOPHIL # BLD AUTO: 0.1 THOU/UL (ref 0–0.7)
EOSINOPHIL NFR BLD AUTO: 0.7 % (ref 0–10)
GLUCOSE SERPL-MCNC: 107 MG/DL (ref 83–110)
HGB BLD-MCNC: 14.7 G/DL (ref 12–16)
LYMPHOCYTES # BLD: 2.9 THOU/UL (ref 1.2–3.4)
LYMPHOCYTES NFR BLD AUTO: 37.9 % (ref 21–51)
MCH RBC QN AUTO: 32 PG (ref 27–31)
MCV RBC AUTO: 97.5 FL (ref 81–99)
MONOCYTES # BLD AUTO: 0.8 THOU/UL (ref 0.11–0.59)
MONOCYTES NFR BLD AUTO: 10.6 % (ref 0–10)
NEUTROPHILS # BLD AUTO: 3.9 THOU/UL (ref 1.4–6.5)
NEUTROPHILS NFR BLD AUTO: 50.7 % (ref 42–75)
PLATELET # BLD AUTO: 382 THOU/UL (ref 130–400)
POTASSIUM SERPL-SCNC: 4.7 MMOL/L (ref 3.5–5.1)
RBC # BLD AUTO: 4.6 MILL/UL (ref 4.2–5.4)
SODIUM SERPL-SCNC: 135 MMOL/L (ref 136–145)
WBC # BLD AUTO: 7.7 THOU/UL (ref 4.8–10.8)

## 2018-04-13 RX ADMIN — Medication SCH: at 22:39

## 2018-04-13 RX ADMIN — LACTOBACILLUS ACIDOPH-L.BULGARICUS 1 MILLION CELL CHEWABLE TABLET SCH TAB: at 09:47

## 2018-04-13 RX ADMIN — LACTOBACILLUS ACIDOPH-L.BULGARICUS 1 MILLION CELL CHEWABLE TABLET SCH: at 15:11

## 2018-04-13 RX ADMIN — LACTOBACILLUS ACIDOPH-L.BULGARICUS 1 MILLION CELL CHEWABLE TABLET SCH: at 22:38

## 2018-04-13 RX ADMIN — MEROPENEM AND SODIUM CHLORIDE SCH MLS: 1 INJECTION, SOLUTION INTRAVENOUS at 22:38

## 2018-04-13 RX ADMIN — MEGESTROL ACETATE SCH: 40 SUSPENSION ORAL at 22:38

## 2018-04-13 RX ADMIN — POTASSIUM CHLORIDE, DEXTROSE MONOHYDRATE AND SODIUM CHLORIDE SCH MLS: 150; 5; 450 INJECTION, SOLUTION INTRAVENOUS at 03:30

## 2018-04-13 RX ADMIN — Medication SCH: at 09:48

## 2018-04-13 RX ADMIN — LEVOTHYROXINE SODIUM ANHYDROUS SCH MCG: 100 INJECTION, POWDER, LYOPHILIZED, FOR SOLUTION INTRAVENOUS at 05:11

## 2018-04-13 NOTE — PDOC.PN
- Subjective


Encounter Start Date: 04/13/18


Encounter Start Time: 09:45





Patient seen and examined. No new complaints. No overnight events. Poor appetite





- Objective


Resuscitation Status: 


 











Resuscitation Status           DNR:Do Not Resuscitate














MAR Reviewed: Yes


Vital Signs & Weight: 


 Vital Signs (12 hours)











  Temp Pulse Resp BP Pulse Ox


 


 04/13/18 11:41  97.6 F  100  18  109/72  93 L








 Weight











Admit Weight                   145 lb 4 oz


 


Weight                         145 lb 4 oz














I&O: 


 











 04/12/18 04/13/18 04/14/18





 06:59 06:59 06:59


 


Intake Total 1550 1380 75


 


Output Total 905 800 50


 


Balance 645 580 25











Result Diagrams: 


 04/13/18 03:55





 04/13/18 03:55


Additional Labs: 


 Accuchecks











  04/13/18 04/13/18 04/13/18





  16:29 11:41 05:20


 


POC Glucose  93  107  92














  04/12/18





  21:06


 


POC Glucose  116 H














Phys Exam





- Physical Examination


Constitutional: NAD


Respiratory: no wheezing, no rhonchi


Scat rales at bases


Cardiovascular: RRR, no rub


Gastrointestinal: soft, non-tender, positive bowel sounds


Musculoskeletal: no edema


Neurological: non-focal, moves all 4 limbs





Dx/Plan





- Plan


DVT proph w/SCDs





IMPRESSION:


1.  Sepsis with acute organ dysfunction due to acute cholangitis/acute 

cholecystitis s/p Cholecystomy 


2.  Toxic Metabolic Encephalopathy - multifactorial - improving


3.  ?Aspiration Pneumonia


4.  Chronic atrial fibrillation - on Lovenox


5.  Hypertension.


6.  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  

STAGE I (Present of admission)


7.  Hypokalemia / s/p Code green/ Other issues per previous notes





PLAN:  


* Cont Meropenem


* AM labs


* DC planning when appetite improves


* Cont IVF due to poor appetite


* Cont to monitor


* Cont Varela for now








Review of Systems





- Review of Systems


Respiratory: negative: Cough, Dry, Shortness of Breath, Hemoptysis, SOB with 

Excertion, Pleuritic Pain, Sputum, Wheezing


Cardiovascular: negative: chest pain, palpitations, orthopnea, paroxysmal 

nocturnal dyspnea, edema, light headedness, other





- Medications/Allergies


Allergies/Adverse Reactions: 


 Allergies











Allergy/AdvReac Type Severity Reaction Status Date / Time


 


codeine Allergy   Verified 07/21/14 20:16











Medications: 


 Current Medications





Acidophilus (Floranex)  1 tab PO TID [facility]


   Last Admin: 04/13/18 15:11 Dose:  Not Given


Aspirin (Aspirin Chewable)  81 mg PO DAILY [facility]


   Last Admin: 04/13/18 09:47 Dose:  81 mg


Benzonatate (Tessalon)  200 mg PO TIDPRN PRN


   PRN Reason: Cough


Calcium Carbonate (Tums)  1,000 mg PO Q4H PRN


   PRN Reason: Heartburn  or Indigestion


Enoxaparin Sodium (Lovenox)  60 mg SC 0900,2100 [facility]


   Last Admin: 04/13/18 12:53 Dose:  60 mg


Hydralazine HCl (Apresoline)  10 mg SLOW IVP Q4H PRN


   PRN Reason: SBP Greater Than 180


Potassium Chloride/Dextrose/Sod Cl (D5 1/2 Ns W/20 Meq Kcl)  1,000 mls @ 50 mls/

hr IV .Q20H [facility]


   Last Admin: 04/13/18 03:30 Dose:  1,000 mls


Meropenem 1 gm/ Device  50 mls @ 100 mls/hr IVPB Q8HR [facility]


Labetalol HCl (Normodyne)  10 mg SLOW IVP Q4H PRN


   PRN Reason: Systolic BP > 160


Levothyroxine Sodium (Synthroid)  40 mcg IVP 0600 [facility]


   Last Admin: 04/13/18 05:11 Dose:  40 mcg


Megestrol Acetate (Megace)  400 mg PO BID [facility]


Miscellaneous Medication (Pharmacy To Dose)  1 each IVPB PRN PRN


   PRN Reason: Pharmacy to dose


Nadolol (Corgard)  40 mg PO DAILY [facility]


   Last Admin: 04/13/18 09:47 Dose:  40 mg


Ondansetron HCl (Zofran Odt)  4 mg PO Q6H PRN


   PRN Reason: Nausea/Vomiting


Ondansetron HCl (Zofran)  4 mg IVP Q6H PRN


   PRN Reason: Nausea/Vomiting


Pantoprazole Sodium (Protonix)  40 mg IVP DAILY [facility]


   Last Admin: 04/13/18 09:47 Dose:  40 mg


Simvastatin (Zocor)  5 mg PO HS [facility]


   Last Admin: 04/12/18 20:58 Dose:  5 mg


Sodium Chloride (Flush - Normal Saline)  10 ml IVF Q12HR [facility]


   Last Admin: 04/13/18 09:48 Dose:  Not Given


Sodium Chloride (Flush - Normal Saline)  10 ml IVF PRN PRN


   PRN Reason: Saline Flush


   Last Admin: 04/09/18 05:18 Dose:  10 ml


Tramadol HCl (Ultram)  50 mg PO Q4H PRN


   PRN Reason: Pain


   Last Admin: 04/13/18 12:53 Dose:  50 mg

## 2018-04-14 RX ADMIN — MEGESTROL ACETATE SCH MG: 40 SUSPENSION ORAL at 08:03

## 2018-04-14 RX ADMIN — LEVOTHYROXINE SODIUM ANHYDROUS SCH MCG: 100 INJECTION, POWDER, LYOPHILIZED, FOR SOLUTION INTRAVENOUS at 06:14

## 2018-04-14 RX ADMIN — POTASSIUM CHLORIDE, DEXTROSE MONOHYDRATE AND SODIUM CHLORIDE SCH MLS: 150; 5; 450 INJECTION, SOLUTION INTRAVENOUS at 21:19

## 2018-04-14 RX ADMIN — Medication SCH: at 21:17

## 2018-04-14 RX ADMIN — LACTOBACILLUS ACIDOPH-L.BULGARICUS 1 MILLION CELL CHEWABLE TABLET SCH: at 21:17

## 2018-04-14 RX ADMIN — MEROPENEM AND SODIUM CHLORIDE SCH MLS: 1 INJECTION, SOLUTION INTRAVENOUS at 14:13

## 2018-04-14 RX ADMIN — Medication SCH: at 08:05

## 2018-04-14 RX ADMIN — LACTOBACILLUS ACIDOPH-L.BULGARICUS 1 MILLION CELL CHEWABLE TABLET SCH TAB: at 08:04

## 2018-04-14 RX ADMIN — POTASSIUM CHLORIDE, DEXTROSE MONOHYDRATE AND SODIUM CHLORIDE SCH MLS: 150; 5; 450 INJECTION, SOLUTION INTRAVENOUS at 08:00

## 2018-04-14 RX ADMIN — MEROPENEM AND SODIUM CHLORIDE SCH MLS: 1 INJECTION, SOLUTION INTRAVENOUS at 21:16

## 2018-04-14 RX ADMIN — MEROPENEM AND SODIUM CHLORIDE SCH MLS: 1 INJECTION, SOLUTION INTRAVENOUS at 06:14

## 2018-04-14 RX ADMIN — LACTOBACILLUS ACIDOPH-L.BULGARICUS 1 MILLION CELL CHEWABLE TABLET SCH: at 16:12

## 2018-04-14 RX ADMIN — MEGESTROL ACETATE SCH: 40 SUSPENSION ORAL at 21:17

## 2018-04-14 NOTE — PDOC.PN
- Subjective


Encounter Start Date: 04/14/18


Encounter Start Time: 18:30





Patient seen and examined. No new complaints. No overnight events





- Objective


Resuscitation Status: 


 











Resuscitation Status           DNR:Do Not Resuscitate














MAR Reviewed: Yes


Vital Signs & Weight: 


 Vital Signs (12 hours)











  Temp Pulse Resp BP Pulse Ox


 


 04/14/18 20:00  97.9 F  99  20  149/93 H  96








 Weight











Admit Weight                   145 lb 4 oz


 


Weight                         145 lb 4 oz














I&O: 


 











 04/13/18 04/14/18 04/15/18





 06:59 06:59 06:59


 


Intake Total 1380 75 240


 


Output Total 800 70 750


 


Balance 580 5 -510











Result Diagrams: 


 04/15/18 04:09





 04/15/18 04:09


Additional Labs: 


 Accuchecks











  04/14/18 04/14/18 04/14/18





  19:48 16:17 10:56


 


POC Glucose  101  133 H  112 H














  04/14/18 04/13/18





  05:38 20:57


 


POC Glucose  101  101














Phys Exam





- Physical Examination


Constitutional: NAD


Respiratory: no wheezing, no rhonchi


Scat rales at bases


Cardiovascular: RRR, no rub


Gastrointestinal: soft, non-tender, positive bowel sounds





Dx/Plan





- Plan


DVT proph w/lovenox, DVT proph w/SCDs





IMPRESSION:


1.  Sepsis with acute organ dysfunction due to acute cholangitis/acute 

cholecystitis s/p Cholecystomy 


2.  Toxic Metabolic Encephalopathy - multifactorial - improving


3.  ?Aspiration Pneumonia


4.  Chronic atrial fibrillation - on Lovenox


5.  Hypertension.


6.  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  

STAGE I (Present of admission)


7.  Hypokalemia / s/p Code green/ Other issues per previous notes





PLAN:  


* Cont Atbx


* DC in 1-2 days if appetite improves - Family declines PEG


* Cont IVF due to poor appetite


* Cont to monitor


* Cont current meds as below


* Cont wound care








Review of Systems





- Review of Systems


Respiratory: negative: Cough, Dry, Shortness of Breath, Hemoptysis, SOB with 

Excertion, Pleuritic Pain, Sputum, Wheezing


Cardiovascular: negative: chest pain, palpitations, orthopnea, paroxysmal 

nocturnal dyspnea, edema, light headedness, other





- Medications/Allergies


Allergies/Adverse Reactions: 


 Allergies











Allergy/AdvReac Type Severity Reaction Status Date / Time


 


codeine Allergy   Verified 07/21/14 20:16











Medications: 


 Current Medications





Acidophilus (Floranex)  1 tab PO TID [facility]


   Last Admin: 04/14/18 21:17 Dose:  Not Given


Aspirin (Aspirin Chewable)  81 mg PO DAILY [facility]


   Last Admin: 04/14/18 08:04 Dose:  81 mg


Benzonatate (Tessalon)  200 mg PO TIDPRN PRN


   PRN Reason: Cough


Calcium Carbonate (Tums)  1,000 mg PO Q4H PRN


   PRN Reason: Heartburn  or Indigestion


Enoxaparin Sodium (Lovenox)  60 mg SC 0900,2100 [facility]


   Last Admin: 04/14/18 21:16 Dose:  60 mg


Hydralazine HCl (Apresoline)  10 mg SLOW IVP Q4H PRN


   PRN Reason: SBP Greater Than 180


Potassium Chloride/Dextrose/Sod Cl (D5 1/2 Ns W/20 Meq Kcl)  1,000 mls @ 50 mls/

hr IV .Q20H [facility]


   Last Admin: 04/14/18 21:19 Dose:  1,000 mls


Meropenem 1 gm/ Device  50 mls @ 100 mls/hr IVPB Q8HR [facility]


   Last Admin: 04/14/18 21:16 Dose:  50 mls


Labetalol HCl (Normodyne)  10 mg SLOW IVP Q4H PRN


   PRN Reason: Systolic BP > 160


Levothyroxine Sodium (Synthroid)  40 mcg IVP 0600 [facility]


   Last Admin: 04/14/18 06:14 Dose:  40 mcg


Megestrol Acetate (Megace)  400 mg PO BID [facility]


   Last Admin: 04/14/18 21:17 Dose:  Not Given


Miscellaneous Medication (Pharmacy To Dose)  1 each IVPB PRN PRN


   PRN Reason: Pharmacy to dose


Nadolol (Corgard)  40 mg PO DAILY [facility]


   Last Admin: 04/14/18 08:04 Dose:  40 mg


Ondansetron HCl (Zofran Odt)  4 mg PO Q6H PRN


   PRN Reason: Nausea/Vomiting


Ondansetron HCl (Zofran)  4 mg IVP Q6H PRN


   PRN Reason: Nausea/Vomiting


Pantoprazole Sodium (Protonix)  40 mg IVP DAILY [facility]


   Last Admin: 04/14/18 08:04 Dose:  40 mg


Simvastatin (Zocor)  5 mg PO HS [facility]


   Last Admin: 04/14/18 21:17 Dose:  Not Given


Sodium Chloride (Flush - Normal Saline)  10 ml IVF Q12HR [facility]


   Last Admin: 04/14/18 21:17 Dose:  Not Given


Sodium Chloride (Flush - Normal Saline)  10 ml IVF PRN PRN


   PRN Reason: Saline Flush


   Last Admin: 04/09/18 05:18 Dose:  10 ml


Tramadol HCl (Ultram)  50 mg PO Q4H PRN


   PRN Reason: Pain


   Last Admin: 04/14/18 08:11 Dose:  50 mg

## 2018-04-15 LAB
ALBUMIN SERPL BCG-MCNC: 2.7 G/DL (ref 3.4–4.8)
ALP SERPL-CCNC: 140 U/L (ref 40–150)
ALT SERPL W P-5'-P-CCNC: 16 U/L (ref 8–55)
ANION GAP SERPL CALC-SCNC: 9 MMOL/L (ref 10–20)
AST SERPL-CCNC: 17 U/L (ref 5–34)
BASOPHILS # BLD AUTO: 0.1 THOU/UL (ref 0–0.2)
BASOPHILS NFR BLD AUTO: 0.9 % (ref 0–1)
BILIRUB SERPL-MCNC: 1.5 MG/DL (ref 0.2–1.2)
BUN SERPL-MCNC: 4 MG/DL (ref 9.8–20.1)
CALCIUM SERPL-MCNC: 8.6 MG/DL (ref 7.8–10.44)
CHLORIDE SERPL-SCNC: 105 MMOL/L (ref 98–107)
CO2 SERPL-SCNC: 31 MMOL/L (ref 23–31)
CREAT CL PREDICTED SERPL C-G-VRATE: 66 ML/MIN (ref 70–130)
EOSINOPHIL # BLD AUTO: 0.1 THOU/UL (ref 0–0.7)
EOSINOPHIL NFR BLD AUTO: 1.1 % (ref 0–10)
GLOBULIN SER CALC-MCNC: 3.6 G/DL (ref 2.4–3.5)
GLUCOSE SERPL-MCNC: 106 MG/DL (ref 83–110)
HGB BLD-MCNC: 14.7 G/DL (ref 12–16)
HGB BLD-MCNC: 14.9 G/DL (ref 12–16)
LYMPHOCYTES # BLD: 2.3 THOU/UL (ref 1.2–3.4)
LYMPHOCYTES NFR BLD AUTO: 33.9 % (ref 21–51)
MAGNESIUM SERPL-MCNC: 2 MG/DL (ref 1.6–2.6)
MCH RBC QN AUTO: 31.9 PG (ref 27–31)
MCV RBC AUTO: 101 FL (ref 81–99)
MONOCYTES # BLD AUTO: 0.8 THOU/UL (ref 0.11–0.59)
MONOCYTES NFR BLD AUTO: 12.1 % (ref 0–10)
NEUTROPHILS # BLD AUTO: 3.6 THOU/UL (ref 1.4–6.5)
NEUTROPHILS NFR BLD AUTO: 52.1 % (ref 42–75)
PLATELET # BLD AUTO: 419 THOU/UL (ref 130–400)
PLATELET # BLD AUTO: 421 THOU/UL (ref 130–400)
POTASSIUM SERPL-SCNC: 4 MMOL/L (ref 3.5–5.1)
RBC # BLD AUTO: 4.61 MILL/UL (ref 4.2–5.4)
SODIUM SERPL-SCNC: 141 MMOL/L (ref 136–145)
WBC # BLD AUTO: 6.9 THOU/UL (ref 4.8–10.8)

## 2018-04-15 RX ADMIN — Medication SCH: at 20:42

## 2018-04-15 RX ADMIN — LACTOBACILLUS ACIDOPH-L.BULGARICUS 1 MILLION CELL CHEWABLE TABLET SCH: at 07:49

## 2018-04-15 RX ADMIN — Medication SCH ML: at 07:48

## 2018-04-15 RX ADMIN — POTASSIUM CHLORIDE, DEXTROSE MONOHYDRATE AND SODIUM CHLORIDE SCH MLS: 150; 5; 450 INJECTION, SOLUTION INTRAVENOUS at 20:59

## 2018-04-15 RX ADMIN — MEROPENEM AND SODIUM CHLORIDE SCH MLS: 1 INJECTION, SOLUTION INTRAVENOUS at 20:59

## 2018-04-15 RX ADMIN — MEROPENEM AND SODIUM CHLORIDE SCH MLS: 1 INJECTION, SOLUTION INTRAVENOUS at 06:04

## 2018-04-15 RX ADMIN — MEROPENEM AND SODIUM CHLORIDE SCH MLS: 1 INJECTION, SOLUTION INTRAVENOUS at 14:36

## 2018-04-15 RX ADMIN — MEGESTROL ACETATE SCH: 40 SUSPENSION ORAL at 20:42

## 2018-04-15 RX ADMIN — LEVOTHYROXINE SODIUM ANHYDROUS SCH MCG: 100 INJECTION, POWDER, LYOPHILIZED, FOR SOLUTION INTRAVENOUS at 06:04

## 2018-04-15 RX ADMIN — LACTOBACILLUS ACIDOPH-L.BULGARICUS 1 MILLION CELL CHEWABLE TABLET SCH: at 14:36

## 2018-04-15 RX ADMIN — LACTOBACILLUS ACIDOPH-L.BULGARICUS 1 MILLION CELL CHEWABLE TABLET SCH: at 20:42

## 2018-04-15 RX ADMIN — MEGESTROL ACETATE SCH: 40 SUSPENSION ORAL at 07:49

## 2018-04-15 NOTE — PDOC.PN
- Subjective


Encounter Start Date: 04/15/18


Encounter Start Time: 11:00





Patient seen and examined. No new complaints. No overnight events





- Objective


Resuscitation Status: 


 











Resuscitation Status           DNR:Do Not Resuscitate














MAR Reviewed: Yes


Vital Signs & Weight: 


 Vital Signs (12 hours)











  Temp Pulse Resp BP Pulse Ox


 


 04/15/18 20:00  97.2 F L  99  20  147/64 H  97


 


 04/15/18 16:02  97.2 F L  110 H  22 H  149/97 H  98


 


 04/15/18 11:10  97.5 F L  95  22 H  156/83 H  96








 Weight











Admit Weight                   145 lb 4 oz


 


Weight                         145 lb 4 oz














I&O: 


 











 04/14/18 04/15/18 04/16/18





 06:59 06:59 06:59


 


Intake Total 75 840 529


 


Output Total 70 2650 2000


 


Balance 5 -4800 -5708











Result Diagrams: 


 04/15/18 04:09





 04/15/18 04:09


Additional Labs: 


 Accuchecks











  04/15/18 04/15/18 04/15/18





  16:05 11:14 05:54


 


POC Glucose  86  111 H  95














Phys Exam





- Physical Examination


Constitutional: NAD


Respiratory: no wheezing


Cardiovascular: RRR, no rub


Gastrointestinal: soft, positive bowel sounds


Musculoskeletal: no edema


Neurological: moves all 4 limbs


Psychiatric: A&O x 3





Dx/Plan





- Plan


DVT proph w/SCDs





IMPRESSION:


1.  Sepsis with acute organ dysfunction due to acute cholangitis/acute 

cholecystitis s/p Cholecystomy 


2.  Toxic Metabolic Encephalopathy - multifactorial - improving


3.  ?Aspiration Pneumonia


4.  Chronic atrial fibrillation - on Lovenox


5.  Hypertension.


6.  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  

STAGE I (Present of admission)


7.  Hypokalemia / s/p Code green/ Other issues per previous notes





PLAN:  


* Still has very poor appetite. I d/w DPOA - Rachelle. Will consult Hospice.


* Cont Atbx


* Cont IVF due to poor appetite


* Cont to monitor


* Cont current meds as below/wound care








Review of Systems





- Review of Systems


Other: 





Cannot obtain due to current cognition





- Medications/Allergies


Allergies/Adverse Reactions: 


 Allergies











Allergy/AdvReac Type Severity Reaction Status Date / Time


 


codeine Allergy   Verified 07/21/14 20:16











Medications: 


 Current Medications





Acidophilus (Floranex)  1 tab PO TID [facility]


   Last Admin: 04/15/18 20:42 Dose:  Not Given


Aspirin (Aspirin Chewable)  81 mg PO DAILY [facility]


   Last Admin: 04/15/18 07:49 Dose:  Not Given


Benzonatate (Tessalon)  200 mg PO TIDPRN PRN


   PRN Reason: Cough


Calcium Carbonate (Tums)  1,000 mg PO Q4H PRN


   PRN Reason: Heartburn  or Indigestion


Enoxaparin Sodium (Lovenox)  60 mg SC 0900,2100 [facility]


   Last Admin: 04/15/18 20:58 Dose:  60 mg


Fentanyl (Sublimaze)  12.5 mcg SLOW IVP Q4H PRN


   PRN Reason: Severe Pain (7-10)


   Last Admin: 04/15/18 17:34 Dose:  12.5 mcg


Hydralazine HCl (Apresoline)  10 mg SLOW IVP Q4H PRN


   PRN Reason: SBP Greater Than 180


Potassium Chloride/Dextrose/Sod Cl (D5 1/2 Ns W/20 Meq Kcl)  1,000 mls @ 50 mls/

hr IV .Q20H [facility]


   Last Admin: 04/15/18 20:59 Dose:  1,000 mls


Meropenem 1 gm/ Device  50 mls @ 100 mls/hr IVPB Q8HR [facility]


   Last Admin: 04/15/18 20:59 Dose:  50 mls


Labetalol HCl (Normodyne)  10 mg SLOW IVP Q4H PRN


   PRN Reason: Systolic BP > 160


Levothyroxine Sodium (Synthroid)  40 mcg IVP 0600 [facility]


   Last Admin: 04/15/18 06:04 Dose:  40 mcg


Megestrol Acetate (Megace)  400 mg PO BID [facility]


   Last Admin: 04/15/18 20:42 Dose:  Not Given


Miscellaneous Medication (Pharmacy To Dose)  1 each IVPB PRN PRN


   PRN Reason: Pharmacy to dose


Nadolol (Corgard)  40 mg PO DAILY [facility]


   Last Admin: 04/15/18 07:49 Dose:  Not Given


Ondansetron HCl (Zofran Odt)  4 mg PO Q6H PRN


   PRN Reason: Nausea/Vomiting


Ondansetron HCl (Zofran)  4 mg IVP Q6H PRN


   PRN Reason: Nausea/Vomiting


Pantoprazole Sodium (Protonix)  40 mg IVP DAILY [facility]


   Last Admin: 04/15/18 07:48 Dose:  40 mg


Simvastatin (Zocor)  5 mg PO HS [facility]


   Last Admin: 04/15/18 20:42 Dose:  Not Given


Sodium Chloride (Flush - Normal Saline)  10 ml IVF Q12HR [facility]


   Last Admin: 04/15/18 20:42 Dose:  Not Given


Sodium Chloride (Flush - Normal Saline)  10 ml IVF PRN PRN


   PRN Reason: Saline Flush


   Last Admin: 04/09/18 05:18 Dose:  10 ml


Tramadol HCl (Ultram)  50 mg PO Q4H PRN


   PRN Reason: Pain


   Last Admin: 04/14/18 08:11 Dose:  50 mg

## 2018-04-16 RX ADMIN — LACTOBACILLUS ACIDOPH-L.BULGARICUS 1 MILLION CELL CHEWABLE TABLET SCH TAB: at 22:27

## 2018-04-16 RX ADMIN — Medication SCH ML: at 10:42

## 2018-04-16 RX ADMIN — MEGESTROL ACETATE SCH MG: 40 SUSPENSION ORAL at 22:27

## 2018-04-16 RX ADMIN — MEROPENEM AND SODIUM CHLORIDE SCH MLS: 1 INJECTION, SOLUTION INTRAVENOUS at 14:50

## 2018-04-16 RX ADMIN — LEVOTHYROXINE SODIUM ANHYDROUS SCH MCG: 100 INJECTION, POWDER, LYOPHILIZED, FOR SOLUTION INTRAVENOUS at 05:01

## 2018-04-16 RX ADMIN — MEROPENEM AND SODIUM CHLORIDE SCH MLS: 1 INJECTION, SOLUTION INTRAVENOUS at 05:02

## 2018-04-16 RX ADMIN — LACTOBACILLUS ACIDOPH-L.BULGARICUS 1 MILLION CELL CHEWABLE TABLET SCH: at 14:52

## 2018-04-16 RX ADMIN — MEGESTROL ACETATE SCH: 40 SUSPENSION ORAL at 10:38

## 2018-04-16 RX ADMIN — LACTOBACILLUS ACIDOPH-L.BULGARICUS 1 MILLION CELL CHEWABLE TABLET SCH: at 10:38

## 2018-04-16 RX ADMIN — Medication SCH: at 22:26

## 2018-04-16 RX ADMIN — POTASSIUM CHLORIDE, DEXTROSE MONOHYDRATE AND SODIUM CHLORIDE SCH MLS: 150; 5; 450 INJECTION, SOLUTION INTRAVENOUS at 14:55

## 2018-04-16 NOTE — PDOC.PN
- Subjective


Encounter Start Date: 04/16/18


Encounter Start Time: 14:30


-: non-verbal





Patient seen and examined. 





- Objective


Resuscitation Status: 


 











Resuscitation Status           DNR:Do Not Resuscitate














MAR Reviewed: Yes


Vital Signs & Weight: 


 Vital Signs (12 hours)











  Temp Pulse Pulse Pulse Resp BP BP


 


 04/16/18 16:00  97.4 F L  106 H    12  


 


 04/16/18 09:08    106 H  97   159/98 H  159/88 H














  BP Pulse Ox Pulse Ox Pulse Ox


 


 04/16/18 16:00  126/79  96  


 


 04/16/18 09:08    96  97








 Weight











Admit Weight                   145 lb 4 oz


 


Weight                         145 lb 4 oz














I&O: 


 











 04/15/18 04/16/18 04/17/18





 06:59 06:59 06:59


 


Intake Total 840 1129 700


 


Output Total 2650 2790 700


 


Balance -1810 -1661 0











Result Diagrams: 


 04/15/18 04:09





 04/15/18 04:09


Additional Labs: 


 Accuchecks











  04/16/18 04/16/18 04/16/18





  17:15 11:06 05:06


 


POC Glucose  107  109  101














  04/15/18





  20:04


 


POC Glucose  106














Phys Exam





- Physical Examination


Constitutional: NAD


Respiratory: no wheezing, no rhonchi


Cardiovascular: RRR, no rub


Gastrointestinal: soft, positive bowel sounds





Dx/Plan





- Plan


DVT proph w/lovenox, DVT proph w/SCDs





IMPRESSION:


1.  Sepsis with acute organ dysfunction due to acute cholangitis/acute 

cholecystitis s/p Cholecystomy 


2.  Toxic Metabolic Encephalopathy - multifactorial - improving


3.  ?Aspiration Pneumonia


4.  Chronic atrial fibrillation - on Lovenox


5.  Hypertension.


6.  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  

STAGE I (Present of admission)


7.  Hypokalemia / s/p Code green/ Other issues per previous notes





PLAN:  


* Await Hospice Eval


* Cont current meds as below/wound care











Review of Systems





- Review of Systems


Other: 





Cannot obtain due to current mentation





- Medications/Allergies


Allergies/Adverse Reactions: 


 Allergies











Allergy/AdvReac Type Severity Reaction Status Date / Time


 


codeine Allergy   Verified 07/21/14 20:16











Medications: 


 Current Medications





Acidophilus (Floranex)  1 tab PO TID [facility]


   Last Admin: 04/16/18 14:52 Dose:  Not Given


Aspirin (Aspirin Chewable)  81 mg PO DAILY [facility]


   Last Admin: 04/16/18 10:38 Dose:  Not Given


Benzonatate (Tessalon)  200 mg PO TIDPRN PRN


   PRN Reason: Cough


Calcium Carbonate (Tums)  1,000 mg PO Q4H PRN


   PRN Reason: Heartburn  or Indigestion


Enoxaparin Sodium (Lovenox)  60 mg SC 0900,2100 [facility]


   Last Admin: 04/16/18 10:42 Dose:  60 mg


Fentanyl (Sublimaze)  12.5 mcg SLOW IVP Q4H PRN


   PRN Reason: Severe Pain (7-10)


   Last Admin: 04/15/18 17:34 Dose:  12.5 mcg


Hydralazine HCl (Apresoline)  10 mg SLOW IVP Q4H PRN


   PRN Reason: SBP Greater Than 180


Potassium Chloride/Dextrose/Sod Cl (D5 1/2 Ns W/20 Meq Kcl)  1,000 mls @ 50 mls/

hr IV .Q20H [facility]


   Last Admin: 04/16/18 14:55 Dose:  1,000 mls


Ceftriaxone Sodium 1 gm/ (Sodium Chloride)  100 mls @ 200 mls/hr IVPB DAILY [facility]


Labetalol HCl (Normodyne)  10 mg SLOW IVP Q4H PRN


   PRN Reason: Systolic BP > 160


Levothyroxine Sodium (Synthroid)  40 mcg IVP 0600 [facility]


   Last Admin: 04/16/18 05:01 Dose:  40 mcg


Megestrol Acetate (Megace)  400 mg PO BID [facility]


   Last Admin: 04/16/18 10:38 Dose:  Not Given


Miscellaneous Medication (Pharmacy To Dose)  1 each IVPB PRN PRN


   PRN Reason: Pharmacy to dose


Nadolol (Corgard)  40 mg PO DAILY [facility]


   Last Admin: 04/16/18 10:38 Dose:  Not Given


Ondansetron HCl (Zofran Odt)  4 mg PO Q6H PRN


   PRN Reason: Nausea/Vomiting


Ondansetron HCl (Zofran)  4 mg IVP Q6H PRN


   PRN Reason: Nausea/Vomiting


Pantoprazole Sodium (Protonix)  40 mg IVP DAILY [facility]


   Last Admin: 04/16/18 10:42 Dose:  40 mg


Simvastatin (Zocor)  5 mg PO HS [facility]


   Last Admin: 04/15/18 20:42 Dose:  Not Given


Sodium Chloride (Flush - Normal Saline)  10 ml IVF Q12HR [facility]


   Last Admin: 04/16/18 10:42 Dose:  10 ml


Sodium Chloride (Flush - Normal Saline)  10 ml IVF PRN PRN


   PRN Reason: Saline Flush


   Last Admin: 04/09/18 05:18 Dose:  10 ml


Tramadol HCl (Ultram)  50 mg PO Q4H PRN


   PRN Reason: Pain


   Last Admin: 04/14/18 08:11 Dose:  50 mg

## 2018-04-17 VITALS — TEMPERATURE: 97.5 F

## 2018-04-17 VITALS — DIASTOLIC BLOOD PRESSURE: 90 MMHG | SYSTOLIC BLOOD PRESSURE: 127 MMHG

## 2018-04-17 LAB
CREAT CL PREDICTED SERPL C-G-VRATE: 81 ML/MIN (ref 70–130)
HGB BLD-MCNC: 14.6 G/DL (ref 12–16)
PLATELET # BLD AUTO: 422 THOU/UL (ref 130–400)

## 2018-04-17 RX ADMIN — MEGESTROL ACETATE SCH: 40 SUSPENSION ORAL at 09:52

## 2018-04-17 RX ADMIN — LEVOTHYROXINE SODIUM ANHYDROUS SCH MCG: 100 INJECTION, POWDER, LYOPHILIZED, FOR SOLUTION INTRAVENOUS at 06:43

## 2018-04-17 RX ADMIN — Medication SCH ML: at 09:52

## 2018-04-17 RX ADMIN — LACTOBACILLUS ACIDOPH-L.BULGARICUS 1 MILLION CELL CHEWABLE TABLET SCH: at 09:52

## 2018-04-17 NOTE — DIS
DATE OF DISCHARGE:  04/17/2018

 

DISCHARGE DISPOSITION:  To [facility] with hospice.

 

ALLERGIES:  CODEINE.

 

FOLLOWUP:  Follow up with primary care physician, Dr. Zhou, at the nursing facility.  The patient w
as seen on the day of discharge.  The patient is still somnolent, not responding to verbal commands.

 

DISCHARGE MEDICATIONS:  Omnicef 300 mg twice a day for 1 more week.  Follow up with Dr. Carey after 1
-2 weeks for removal of the cholecystostomy tube.

 

BRIEF HOSPITAL COURSE:  The patient is an 88-year-old female with dementia, currently residing at [facility], presented to [facility] with sepsis and possible symptoms consistent with ch
olangitis.  The patient was transferred to this facility for hospital admission.  A workup was consis
tent with cholecystitis with questionable cholangitis.  She underwent cholecystostomy tube per Surger
y and GI recommendation.  Culture from the gallbladder aspirate showed E. coli.  A PICC line was plac
ed.  The patient also had a code green for altered mentation, which later improved.  A stroke workup 
was negative.  Due to persistent poor appetite, the family decided on hospice.  The patient will be d
ischarged back to [facility] with hospice.

 

FINAL DIAGNOSES:

1.  Sepsis with acute organ dysfunction secondary to acute cholecystitis with questionable acute chol
angitis, status post cholecystostomy tube.

2.  Toxic metabolic encephalopathy, multifactorial.

3.  Questionable aspiration pneumonia.

4.  Chronic atrial fibrillation, on anticoagulation.  Due to poor appetite and lethargy, the patient 
was placed on Lovenox during the hospital stay.

5.  Hypertension.

6.  Hypokalemia.

7.  Left lateral foot pressure ulcer present on admission.

8.  Stage II sacrococcygeal pressure ulcer present on admission.

9.  Hyperlipidemia.

10.  Dementia.

11.  Swallow dysfunction.

12.  Chronic kidney disease stage 2.

13.  CODEINE allergy.

 

CODE STATUS:  DO NOT RESUSCITATE.

 

SIGNIFICANT LABS:  Lactic acid at [facility] was 3.5.  WBC count was 33.6, , , alk
misael phosphatase 254 with bilirubin 5.6.

 

Total time coordinating the discharge of this patient was 33 minutes.

## 2024-07-09 NOTE — PQF
CLINICAL DOCUMENTATION IMPROVEMENT CLARIFICATION FORM:  ICD-10 Updated

PLEASE DO AN ADDENDUM TO THE PROGRESS NOTE WITH ANY DOCUMENTATION UPDATES OR 
ADDITIONS AND CARRY THROUGH TO DC SUMMARY.   THANK YOU.



DATE:    4/9/18                                      ATTN:  Dr. Meraz



Please exercise your independent, professional judgment in responding to the 
clarification form. 

Clinical indicators are provided on the bottom of this form for your review



Please check appropriate box(s):



_______   I (concur)  with the Nursing Assessment findings as stated below.



[  ] Pressure Ulcer: (Stage I: Erythema; Stage II: Partial thickness; Stage III
: Full thickness; Stage IV: Necrosis to muscle/bone)

     [  ] Location: ___________________________________

              Stage (I to IV): _______ (Left_____   Right_____ Bilateral_____ N/
A_____)

     [  ] Location: ___________________________________

              Stage (I to IV): _______ (Left_____   Right_____ Bilateral_____ N/
A_____)

[  ] No pressure ulcer diagnosis

[  ] Deep tissue injury

[  ] Other diagnosis ___________

[  ] Unable to determine



In addition, please specify:

Present on Admission (POA):  [  ] Yes             [  ] No             [  ] 
Unable to determine



For continuity of documentation, please document condition throughout progress 
notes and discharge summary.  Thank You.



CLINICAL INDICATORS - SIGNS / SYMPTOMS / LABS



NURSING ASSESSMENT 4/5 @ 0800:   LEFT LATERAL FOOT PRESSURE ULCER. STAGE II

                                                            SACROCOCCYGEAL 
PRESSURE ULCER.  STAGE I 



RISKS:

H&P:88 YR OLD WITH DEMENTIA.  HX CVA IN 2014, HTN, CHRONIC ANEMIA. 

        SHE IS WHEELCHAIR BOUND. SEPSIS. UTI. DEHYDRATION. 



TREATMENTS:

SKIN INTERVENTIONS PER NURSING PROTOCOL. 

                                            POSITION CHANGES: Q2H IN BED, Q1 H 
IN CHAIR

                                            SKIN KEPT FROM EXCESSIVE MOISTURE.





                                     

Pre-ulcer skin changes limited to persistent focal edema (Stage 1)

Abrasion, blister, partial thickness skin loss involving epidermis and/or 
dermis (Stage 2)

Full thickness skin loss involving damage or necrosis of SQ tissue. (Stage 3)

Necrosis of soft tissue through to underlying muscle, tendon, or bone. (Stage 4)

Purple or maroon discolored skin or blood filled blister







Thank you,

Jaimie



(This form is maintained as a part of the permanent medical record)

 2015 [employer]  All Rights Reserved

Jaimie Brown RN, BSN    [email or web address]    Office: [phone]

                                                              

[facility]
No